# Patient Record
Sex: FEMALE | Race: WHITE | Employment: FULL TIME | URBAN - METROPOLITAN AREA
[De-identification: names, ages, dates, MRNs, and addresses within clinical notes are randomized per-mention and may not be internally consistent; named-entity substitution may affect disease eponyms.]

---

## 2019-09-27 ENCOUNTER — OFFICE VISIT (OUTPATIENT)
Dept: PRIMARY CARE CLINIC | Age: 24
End: 2019-09-27

## 2019-09-27 VITALS
SYSTOLIC BLOOD PRESSURE: 113 MMHG | WEIGHT: 124.4 LBS | DIASTOLIC BLOOD PRESSURE: 79 MMHG | HEART RATE: 81 BPM | TEMPERATURE: 98.3 F | HEIGHT: 64 IN | BODY MASS INDEX: 21.24 KG/M2 | RESPIRATION RATE: 17 BRPM | OXYGEN SATURATION: 95 %

## 2019-09-27 DIAGNOSIS — F41.0 PANIC ATTACK: ICD-10-CM

## 2019-09-27 DIAGNOSIS — F32.A DEPRESSION, UNSPECIFIED DEPRESSION TYPE: Primary | ICD-10-CM

## 2019-09-27 DIAGNOSIS — Z76.89 ENCOUNTER TO ESTABLISH CARE: ICD-10-CM

## 2019-09-27 RX ORDER — BUPROPION HYDROCHLORIDE 150 MG/1
150 TABLET ORAL
Qty: 30 TAB | Refills: 0 | Status: SHIPPED | OUTPATIENT
Start: 2019-09-27 | End: 2019-10-10 | Stop reason: SDUPTHER

## 2019-09-27 RX ORDER — HYDROXYZINE PAMOATE 25 MG/1
CAPSULE ORAL
Qty: 30 CAP | Refills: 0 | Status: SHIPPED | OUTPATIENT
Start: 2019-09-27 | End: 2022-02-22

## 2019-09-27 NOTE — PROGRESS NOTES
Diana Cross is a 21 y.o. female  Chief Complaint   Patient presents with   [facility]    Request For New Medication     Discuss anti depressants per psychologist Alejandra Burroughs     1. Have you been to the ER, urgent care clinic since your last visit? Hospitalized since your last visit? No    2. Have you seen or consulted any other health care providers outside of the [address] [place] [address] since your last visit? Include any pap smears or colon screening. Yes, Psychologist Alejandra Burroughs.           Visit Vitals  /79 (BP 1 Location: Right arm, BP Patient Position: Sitting)   Pulse 81   Temp 98.3 °F (36.8 °C) (Oral)   Resp 17   Ht 5' 3.78\" (1.62 m)   Wt 124 lb 6.4 oz (56.4 kg)   SpO2 95%   BMI 21.50 kg/m²

## 2019-09-27 NOTE — PROGRESS NOTES
This note will not be viewable in [address] Diana Cross is a 21y.o. year old female who is a new patient to me today. She was previously followed by Dr. Art Beckham in [facility]. -LOV 3 years ago. Diana Cross is a  21 y.o. female presents for visit. Chief Complaint   Patient presents with   [address]    Depression     Discuss anti depressants per psychologist Alejandra Chapa   The history is provided by the patient. This is a new problem. The current episode started more than 1 week ago (9 months ago). The problem occurs daily. Progression since onset: waxing and waning. Associated symptoms include abdominal pain. Pertinent negatives include no chest pain, no headaches and no shortness of breath. Associated symptoms comments: Panic attacks. Joined [employer]. Exacerbated by: menses. social interaction- certain personality type. Relieved by: In mental health counseling using cognitive behavioral therapy. Treatments tried: zoloft- 3 years ago-developed a rash on her hands and patient states she felt like a zombie. The treatment provided no relief. Review of Systems   Constitutional: Positive for malaise/fatigue (chronic). Negative for chills and fever. Eyes: Negative for blurred vision and double vision. Respiratory: Negative for shortness of breath. Cardiovascular: Negative for chest pain and palpitations. Gastrointestinal: Positive for abdominal pain and nausea (Dry heaves). Genitourinary: Negative for dysuria. Musculoskeletal: Negative for back pain and myalgias. Neurological: Negative for headaches. Psychiatric/Behavioral: Positive for depression. Negative for hallucinations, memory loss, substance abuse and suicidal ideas. The patient is nervous/anxious (panic attacks) and has insomnia (able to fall asleep).          Past Medical History:   Diagnosis Date    Borderline personality disorder ([facility] [address] 04/2019    Concussion 2016    Depression Past Surgical History:   Procedure Laterality Date    HX WISDOM TEETH EXTRACTION  2011        Social History     Tobacco Use    Smoking status: Never Smoker    Smokeless tobacco: Never Used   Substance Use Topics    Alcohol use: Never     Frequency: Never      Social History     Social History Narrative    Not on file     Family History   Problem Relation Age of Onset    Depression Mother     Anxiety Mother     Elevated Lipids Mother     Other Mother         Gestational Diabetes    Arthritis-osteo Father     Attention Deficit Disorder Sister     Depression Sister       Prior to Admission medications    Medication Sig Start Date End Date Taking? Authorizing Provider   buPROPion XL (WELLBUTRIN XL) 150 mg tablet Take 1 Tab by mouth every morning. 9/27/19  Yes Tomasa Chávez NP   hydrOXYzine pamoate (VISTARIL) 25 mg capsule 1-2 caps twice daily as need for panic attacks. Indications: anxious 9/27/19  Yes Tomasa Chávez NP      No Known Allergies       Visit Vitals  /79 (BP 1 Location: Right arm, BP Patient Position: Sitting)   Pulse 81   Temp 98.3 °F (36.8 °C) (Oral)   Resp 17   Ht 5' 3.78\" (1.62 m)   Wt 124 lb 6.4 oz (56.4 kg)   LMP 09/19/2019   SpO2 95%   BMI 21.50 kg/m²     Physical Exam   Constitutional: She is oriented to person, place, and time. She appears well-developed and well-nourished. No distress. HENT:   Head: Normocephalic and atraumatic. Right Ear: External ear normal.   Left Ear: External ear normal.   Nose: Nose normal.   Eyes: Conjunctivae are normal.   Cardiovascular: Normal rate, regular rhythm and normal heart sounds. Pulmonary/Chest: Effort normal and breath sounds normal. She has no wheezes. Neurological: She is alert and oriented to person, place, and time. Skin: Skin is warm and dry. Psychiatric: Her speech is normal and behavior is normal. She exhibits a depressed mood. Nursing note and vitals reviewed.         ASSESSMENT AND PLAN:  There are no active problems to display for this patient. ICD-10-CM ICD-9-CM   1. Depression, unspecified depression type F32.9 311   2. Panic attack F41.0 300.01   3. Encounter to establish care Z76.89 V65.8     Orders Placed This Encounter    buPROPion XL (WELLBUTRIN XL) 150 mg tablet     Sig: Take 1 Tab by mouth every morning. Dispense:  30 Tab     Refill:  0    hydrOXYzine pamoate (VISTARIL) 25 mg capsule     Si-2 caps twice daily as need for panic attacks. Indications: anxious     Dispense:  30 Cap     Refill:  0       Diagnoses and all orders for this visit:    1. Depression, unspecified depression type  -     buPROPion XL (WELLBUTRIN XL) 150 mg tablet; Take 1 Tab by mouth every morning. 2. Panic attack  -     hydrOXYzine pamoate (VISTARIL) 25 mg capsule; 1-2 caps twice daily as need for panic attacks. Indications: anxious    3. Encounter to establish care        the following changes in treatment are made: Start Wellbutrin  mg p.o. every morning. Hydroxyzine as needed panic attacks. reviewed diet, exercise and weight control    Agrees to call 911 or suicide hotline if suicidal thoughts present. Patient has good social support. Follow-up and Dispositions    · Return in about 2 weeks (around 10/11/2019), or if symptoms worsen or fail to improve. Disclaimer:  Advised her to call back or return to office if symptoms worsen/change/persist.  Discussed expected course/resolution/complications of diagnosis in detail with patient. Medication risks/benefits/costs/interactions/alternatives discussed with patient. She was given an after visit summary which includes diagnoses, current medications, & vitals. She expressed understanding with the diagnosis and plan.

## 2019-10-10 ENCOUNTER — OFFICE VISIT (OUTPATIENT)
Dept: PRIMARY CARE CLINIC | Age: 24
End: 2019-10-10

## 2019-10-10 VITALS
RESPIRATION RATE: 14 BRPM | OXYGEN SATURATION: 97 % | HEIGHT: 64 IN | HEART RATE: 98 BPM | WEIGHT: 125.2 LBS | BODY MASS INDEX: 21.37 KG/M2 | SYSTOLIC BLOOD PRESSURE: 124 MMHG | TEMPERATURE: 98.1 F | DIASTOLIC BLOOD PRESSURE: 74 MMHG

## 2019-10-10 DIAGNOSIS — F32.A DEPRESSION, UNSPECIFIED DEPRESSION TYPE: Primary | ICD-10-CM

## 2019-10-10 DIAGNOSIS — Z76.0 MEDICATION REFILL: ICD-10-CM

## 2019-10-10 RX ORDER — BUPROPION HYDROCHLORIDE 150 MG/1
150 TABLET ORAL
Qty: 30 TAB | Refills: 2 | Status: SHIPPED | OUTPATIENT
Start: 2019-10-10 | End: 2020-01-15 | Stop reason: SDUPTHER

## 2019-10-10 NOTE — PROGRESS NOTES
This note will not be viewable in [address] Dipika Zacarias is a  21 y.o. female presents for visit. Chief Complaint   Patient presents with    Depression     f/u    Medication Evaluation       HPI      Patient presents for follow-up depression. She started taking Wellbutrin  mg p.o. daily about 2 weeks ago and is feeling well. Denies suicidal ideation and reports she experienced today feeling happy. No reported side effects. She is interested in continuing medication. Has not yet needed to use PRN hydroxyzine for anxiety attack. Review of Systems   Constitutional: Negative for chills and fever. Cardiovascular: Negative for chest pain and palpitations. Skin: Negative for itching and rash. Psychiatric/Behavioral: Positive for depression (improving with medication). Negative for suicidal ideas. Past Medical History:   Diagnosis Date    Borderline personality disorder ([facility] [address] 04/2019    Concussion 2016    Depression       Past Surgical History:   Procedure Laterality Date    HX WISDOM TEETH EXTRACTION  2011        Social History     Tobacco Use    Smoking status: Never Smoker    Smokeless tobacco: Never Used   Substance Use Topics    Alcohol use: Never     Frequency: Never      Social History     Social History Narrative    Not on file     Family History   Problem Relation Age of Onset    Depression Mother     Anxiety Mother     Elevated Lipids Mother     Other Mother         Gestational Diabetes    Arthritis-osteo Father     Attention Deficit Disorder Sister     Depression Sister       Prior to Admission medications    Medication Sig Start Date End Date Taking? Authorizing Provider   buPROPion XL (WELLBUTRIN XL) 150 mg tablet Take 1 Tab by mouth every morning. 10/10/19  Yes Lolly Chávez NP   hydrOXYzine pamoate (VISTARIL) 25 mg capsule 1-2 caps twice daily as need for panic attacks.   Indications: anxious 9/27/19   Lolly Chávez NP   buPROPion XL (WELLBUTRIN XL) 150 mg tablet Take 1 Tab by mouth every morning. 9/27/19 10/10/19  Robert Chávez Neighbors, NP      No Known Allergies       Visit Vitals  /74   Pulse 98   Temp 98.1 °F (36.7 °C) (Oral)   Resp 14   Ht 5' 3.78\" (1.62 m)   Wt 125 lb 3.2 oz (56.8 kg)   LMP 09/19/2019   SpO2 97%   BMI 21.64 kg/m²     Physical Exam   Constitutional: She is oriented to person, place, and time. She appears well-developed and well-nourished. No distress. HENT:   Head: Normocephalic and atraumatic. Right Ear: External ear normal.   Left Ear: External ear normal.   Eyes: Conjunctivae are normal.   Cardiovascular: Normal rate, regular rhythm and normal heart sounds. Pulmonary/Chest: Effort normal and breath sounds normal. She has no wheezes. Musculoskeletal: She exhibits no edema. Neurological: She is alert and oriented to person, place, and time. Skin: Skin is warm and dry. Psychiatric: She has a normal mood and affect. Her behavior is normal.   Nursing note and vitals reviewed. ASSESSMENT AND PLAN:  There are no active problems to display for this patient. ICD-10-CM ICD-9-CM   1. Depression, unspecified depression type F32.9 311   2. Medication refill Z76.0 V68.1     Orders Placed This Encounter    buPROPion XL (WELLBUTRIN XL) 150 mg tablet     Sig: Take 1 Tab by mouth every morning. Dispense:  30 Tab     Refill:  2       Diagnoses and all orders for this visit:    1. Depression, unspecified depression type  -     buPROPion XL (WELLBUTRIN XL) 150 mg tablet; Take 1 Tab by mouth every morning. 2. Medication refill  -     buPROPion XL (WELLBUTRIN XL) 150 mg tablet; Take 1 Tab by mouth every morning. current treatment plan is effective, no change in therapy        Follow-up and Dispositions    · Return in about 4 weeks (around 11/7/2019), or if symptoms worsen or fail to improve, for Depression follow-up.                Disclaimer:  Advised her to call back or return to office if symptoms worsen/change/persist.  Discussed expected course/resolution/complications of diagnosis in detail with patient. Medication risks/benefits/costs/interactions/alternatives discussed with patient. She was given an after visit summary which includes diagnoses, current medications, & vitals. She expressed understanding with the diagnosis and plan.

## 2020-01-15 ENCOUNTER — OFFICE VISIT (OUTPATIENT)
Dept: PRIMARY CARE CLINIC | Age: 25
End: 2020-01-15

## 2020-01-15 VITALS
DIASTOLIC BLOOD PRESSURE: 85 MMHG | TEMPERATURE: 97.8 F | WEIGHT: 125 LBS | RESPIRATION RATE: 16 BRPM | HEART RATE: 82 BPM | SYSTOLIC BLOOD PRESSURE: 112 MMHG | HEIGHT: 64 IN | OXYGEN SATURATION: 99 % | BODY MASS INDEX: 21.34 KG/M2

## 2020-01-15 DIAGNOSIS — Z76.0 MEDICATION REFILL: ICD-10-CM

## 2020-01-15 DIAGNOSIS — F32.A DEPRESSION, UNSPECIFIED DEPRESSION TYPE: ICD-10-CM

## 2020-01-15 RX ORDER — BUPROPION HYDROCHLORIDE 150 MG/1
150 TABLET ORAL
Qty: 90 TAB | Refills: 0 | Status: SHIPPED | OUTPATIENT
Start: 2020-01-15 | End: 2021-03-12 | Stop reason: ALTCHOICE

## 2020-01-15 NOTE — PROGRESS NOTES
[facility]   [address], [address] [facility]  P: [phone]  F: [phone]      Chief Complaint   Patient presents with    Medication Refill     wellbutrin for refill. unsure about vaccination for HPV  states has had tetnus but not over 10 years states not due yet. and [place]. states that she will confier with mother. Aggie Luong is a 21 y.o. female who presents to clinic for Medication Refill (wellbutrin for refill. unsure about vaccination for HPV  states has had tetnus but not over 10 years states not due yet. and [place] pap [place]. states that she will confier with mother. ). HPI:  Lore is a 21 yr old female who presents for depression follow-up. She has previously been evaluated by Ashia Estes our office here and is currently on Wellbutrin  mg tablet daily. She states she was also prescribed hydroxyzine 25 mg capsules, but has only required them once for anxiety. She reports that the Wellbutrin is helping her immensely. She states she works at [employer] since November, and has found the medication helps her have less low thoughts and generally she feels better. She denies any medication side effects. She is requesting a refill today. She states she is followed by a therapist for over a year, and has previously been diagnosed with borderline personality disorder. There are no active problems to display for this patient.      Past Medical History:   Diagnosis Date    Borderline personality disorder ([facility] [address] 04/2019    Concussion 2016    Depression      Past Surgical History:   Procedure Laterality Date    HX WISDOM TEETH EXTRACTION  2011     Social History     Socioeconomic History    Marital status: SINGLE     Spouse name: Not on file    Number of children: Not on file    Years of education: Not on file    Highest education level: Not on file   Occupational History    Not on file   Social Needs    Financial resource strain: Not on file    Food insecurity:     Worry: Not on file     Inability: Not on file    Transportation needs:     Medical: Not on file     Non-medical: Not on file   Tobacco Use    Smoking status: Never Smoker    Smokeless tobacco: Never Used   Substance and Sexual Activity    Alcohol use: Never     Frequency: Never    Drug use: Never    Sexual activity: Never   Lifestyle    Physical activity:     Days per week: Not on file     Minutes per session: Not on file    Stress: Not on file   Relationships    Social connections:     Talks on phone: Not on file     Gets together: Not on file     Attends Scientologist service: Not on file     Active member of club or organization: Not on file     Attends meetings of clubs or organizations: Not on file     Relationship status: Not on file    Intimate partner violence:     Fear of current or ex partner: Not on file     Emotionally abused: Not on file     Physically abused: Not on file     Forced sexual activity: Not on file   Other Topics Concern    Not on file   Social History Narrative    Not on file     Family History   Problem Relation Age of Onset    Depression Mother    [facility] Anxiety Mother     Elevated Lipids Mother     Other Mother         Gestational Diabetes    Arthritis-osteo Father     Attention Deficit Disorder Sister     Depression Sister      No Known Allergies    Current Outpatient Medications   Medication Sig Dispense Refill    buPROPion XL (WELLBUTRIN XL) 150 mg tablet Take 1 Tab by mouth every morning. 90 Tab 0    hydrOXYzine pamoate (VISTARIL) 25 mg capsule 1-2 caps twice daily as need for panic attacks. Indications: anxious 30 Cap 0           The medications were reviewed and updated in the medical record. The past medical history, past surgical history, and family history were reviewed and updated in the medical record.     REVIEW OF SYSTEMS   Review of Systems   Constitutional: Negative for malaise/fatigue. HENT: Negative for congestion. Eyes: Negative for blurred vision and pain. Respiratory: Negative for cough and shortness of breath. Cardiovascular: Negative for chest pain and palpitations. Gastrointestinal: Negative for abdominal pain and heartburn. Genitourinary: Negative for frequency and urgency. Musculoskeletal: Negative for joint pain and myalgias. Neurological: Negative for dizziness, tingling, sensory change, weakness and headaches. Psychiatric/Behavioral: Negative for depression, memory loss and substance abuse. PHYSICAL EXAM     Visit Vitals  /85 (BP 1 Location: Left arm, BP Patient Position: Sitting)   Pulse 82   Temp 97.8 °F (36.6 °C) (Oral)   Resp 16   Ht 5' 3.78\" (1.62 m)   Wt 125 lb (56.7 kg)   LMP 01/10/2020   SpO2 99%   BMI 21.60 kg/m²       Physical Exam  Vitals signs and nursing note reviewed. HENT:      Head: Normocephalic and atraumatic. Right Ear: External ear normal.      Left Ear: External ear normal.   Cardiovascular:      Rate and Rhythm: Normal rate and regular rhythm. Heart sounds: Normal heart sounds. Pulmonary:      Effort: Pulmonary effort is normal.      Breath sounds: Normal breath sounds. Musculoskeletal: Normal range of motion. Skin:     General: Skin is warm and dry. Neurological:      Mental Status: She is alert and oriented to person, place, and time. Gait: Gait is intact. Psychiatric:         Mood and Affect: Affect normal.         Judgment: Judgment normal.       ASSESSMENT/ PLAN   Diagnoses and all orders for this visit:    1. Depression, unspecified depression type  -     buPROPion XL (WELLBUTRIN XL) 150 mg tablet; Take 1 Tab by mouth every morning.  -Stable, declines further resources for me today. Discussed will need follow-ups every 6 months for medication refill. Return to office sooner for change in mood. 2. Medication refill  -     buPROPion XL (WELLBUTRIN XL) 150 mg tablet;  Take 1 Tab by mouth every morning. Disclaimer:  Advised patient to call back or return to office if symptoms worsen/change/persist.  Discussed expected course/resolution/complications of diagnosis in detail with patient.     Medication risks/benefits/alternatives discussed with patient. Patient was given an after visit summary which includes diagnoses, current medications, & vitals.      Discussed patient instructions and advised to read to all patient instructions regarding care.      Patient expressed understanding with the diagnosis and plan. This note will not be viewable in [address]         Bryan West NP  1/15/2020        (This document has been electronically signed)

## 2020-01-15 NOTE — PROGRESS NOTES
Chief Complaint   Patient presents with    Medication Refill     wellbutrin for refill. unsure about vaccination for HPV  states has had tetnus but not over 10 years states not due yet. and had pap at [facility]. states that she will confier with mother.

## 2021-02-03 ENCOUNTER — OFFICE VISIT (OUTPATIENT)
Dept: PRIMARY CARE CLINIC | Age: 26
End: 2021-02-03
Payer: COMMERCIAL

## 2021-02-03 VITALS
BODY MASS INDEX: 23.04 KG/M2 | DIASTOLIC BLOOD PRESSURE: 85 MMHG | RESPIRATION RATE: 16 BRPM | HEART RATE: 90 BPM | OXYGEN SATURATION: 100 % | WEIGHT: 130 LBS | SYSTOLIC BLOOD PRESSURE: 120 MMHG | TEMPERATURE: 98.3 F | HEIGHT: 63 IN

## 2021-02-03 DIAGNOSIS — Z79.899 NEW MEDICATION ADDED: ICD-10-CM

## 2021-02-03 DIAGNOSIS — G43.109 MIGRAINE WITH AURA AND WITHOUT STATUS MIGRAINOSUS, NOT INTRACTABLE: Primary | ICD-10-CM

## 2021-02-03 PROCEDURE — 99213 OFFICE O/P EST LOW 20 MIN: CPT | Performed by: NURSE PRACTITIONER

## 2021-02-03 PROCEDURE — 96372 THER/PROPH/DIAG INJ SC/IM: CPT | Performed by: NURSE PRACTITIONER

## 2021-02-03 RX ORDER — LANOLIN ALCOHOL/MO/W.PET/CERES
400 CREAM (GRAM) TOPICAL DAILY
Qty: 90 TAB | Refills: 1 | Status: SHIPPED | OUTPATIENT
Start: 2021-02-03 | End: 2022-02-22

## 2021-02-03 RX ORDER — KETOROLAC TROMETHAMINE 30 MG/ML
30 INJECTION, SOLUTION INTRAMUSCULAR; INTRAVENOUS
Status: COMPLETED | OUTPATIENT
Start: 2021-02-03 | End: 2021-02-03

## 2021-02-03 RX ADMIN — KETOROLAC TROMETHAMINE 30 MG: 30 INJECTION, SOLUTION INTRAMUSCULAR; INTRAVENOUS at 14:49

## 2021-02-03 NOTE — PROGRESS NOTES
Chief Complaint   Patient presents with    Head Pain     starting to reoccur, after lunch she says they kick up     1. Have you been to the ER, urgent care clinic since your last visit? Hospitalized since your last visit? No    2. Have you seen or consulted any other health care providers outside of the [address] [place] [address] since your last visit? Include any pap smears or colon screening.  No

## 2021-02-03 NOTE — LETTER
NOTIFICATION RETURN TO WORK / SCHOOL 
 
2/3/2021 2:29 PM 
 
[facility] [id number] To Whom It May Concern: 
 
Lucía Patel is currently under the care of Roxanne Taylor. She will return to work/school on: 02/04/2021 If there are questions or concerns please have the patient contact our office.  
 
 
 
Sincerely, 
 
 
Manuela Shelton NP 
 
                                
 
 Patient: Do Oloms  : 1936    Today's Date: 2018    HISTORY OF PRESENT ILLNESS:     History of Present Illness:    Do Olmos is a 79 y.o. male with AF on coumadin, CAD s/p CABG, pacemaker, CHF, dyslipidemia, CKD, diverticulosis who was admitted on  for GI bleed. Reports hematochezia on the morning of admission associated with mild abd discomfort and had 2 additional episodes of BRBPR in the ER. Denies chest pain, shortness of breath, palpitations. On admission, Hgb 9.4, Cr 3.06, INR 2.1. EKG NSR. Patient received 2 units PRBC's during course of hospitalization with Hgb corinne 6.5. EGD, colonoscopy showed no active bleed and GI determined pan-colonic diverticulosis likely source of bleed -- \"Return of similar bleed magnitude can not be prevented short of colectomy. \" Cardiology was consulted for further review of continued anticoagulation given high risk of bleed. Followed by Chandu Rivera and Sherlyn with plan for upgrade to Medtronic BIV ICD for primary prevention of sudden cardiac death.        PAST MEDICAL HISTORY:     Past Medical History:   Diagnosis Date    CAD (coronary artery disease)     CHF (congestive heart failure) (HCC)     CKD (chronic kidney disease), stage IV (HCC)     Hyperlipidemia     Hypertension     Systolic CHF, chronic ([facility] [address]        Past Surgical History:   Procedure Laterality Date    COLONOSCOPY N/A 2017    COLONOSCOPY performed by Marcelino León MD at [address] [place] [address]  N/A 2018    COLONOSCOPY performed by Zohaib Soares MD at [facility]      HX PACEMAKER           MEDICATIONS:     Current Facility-Administered Medications   Medication Dose Route Frequency Provider Last Rate Last Dose    amLODIPine (NORVASC) tablet 5 mg  5 mg Oral DAILY Esther Corrales MD   5 mg at 18 0853    hydrALAZINE (APRESOLINE) 20 mg/mL injection 10 mg  10 mg IntraVENous Q6H PRN Esther Corrales MD       [address] [place] [address] pantoprazole (PROTONIX) 40 mg in sodium chloride 0.9 % 10 mL injection  40 mg IntraVENous DAILY DAYAMI MckeonCLAYTON   40 mg at 02/09/18 0801    sodium chloride (NS) flush 5-10 mL  5-10 mL IntraVENous Q8H Lamberto Tobias MD   10 mL at 02/08/18 2235    sodium chloride (NS) flush 5-10 mL  5-10 mL IntraVENous PRN Lamberto Tobias MD        acetaminophen (TYLENOL) tablet 650 mg  650 mg Oral Q4H PRN Lamberto Tobias MD   650 mg at 02/08/18 0050    morphine injection 2 mg  2 mg IntraVENous Q4H PRN Lamberto Tobias MD        naloxone [facility]) injection 0.4 mg  0.4 mg IntraVENous PRN Lamberto Tobias MD        prochlorperazine (COMPAZINE) injection 5 mg  5 mg IntraVENous Q8H PRN Lamberto Tobias MD        finasteride (PROSCAR) tablet 5 mg  5 mg Oral DAILY Lamberto Tobias MD   5 mg at 02/09/18 0800    senna-docusate (PERICOLACE) 8.6-50 mg per tablet 1 Tab  1 Tab Oral DAILY Lamberto Tobias MD   1 Tab at 02/09/18 0800    tamsulosin (FLOMAX) capsule 0.4 mg  0.4 mg Oral DAILY Lamberto Tobias MD   0.4 mg at 02/09/18 0800    carBAMazepine (TEGretol) tablet 200 mg  200 mg Oral TID Lamberto Tobias MD   200 mg at 02/09/18 0801    0.9% sodium chloride infusion 250 mL  250 mL IntraVENous PRN Kiki Moreno MD           No Known Allergies      SOCIAL HISTORY:     Social History   Substance Use Topics    Smoking status: Former Smoker    Smokeless tobacco: Never Used    Alcohol use No         FAMILY HISTORY:     Family History   Problem Relation Age of Onset    Heart Disease Mother     Hypertension Mother          REVIEW OF SYMPTOMS:     Review of Symptoms:  Constitutional: Negative for fever, chills  HEENT: Negative for nosebleeds, tinnitus, and vision changes. Respiratory: Negative for cough, wheezing  Cardiovascular: Negative for orthopnea, claudication, syncope, and PND. Gastrointestinal: Positive blood in stool, Negative for abdominal pain, diarrhea.   Genitourinary: Negative for dysuria  Musculoskeletal: Negative for myalgias. Skin: Negative for rash  Heme: No problems bleeding. Neurological: Negative for speech change and focal weakness. PHYSICAL EXAM:     Physical Exam:  Visit Vitals    /69 (BP 1 Location: Right arm, BP Patient Position: At rest)    Pulse 62    Temp 98.2 °F (36.8 °C)    Resp 16    Ht 5' 9\" (1.753 m)    Wt 154 lb 5.2 oz (70 kg)    SpO2 100%    BMI 22.79 kg/m2     Patient appears generally well, mood and affect are appropriate and pleasant. HEENT:  Hearing intact, non-icteric, normocephalic, atraumatic. Neck Exam: Supple, No JVD or carotid bruits. Lung Exam: Clear to auscultation, even breath sounds. Cardiac Exam: Regular rate and rhythm with no murmur  Abdomen: Soft, non-tender, normal bowel sounds. No bruits or masses. Extremities: Moves all ext well. No lower extremity edema. Vascular: 2+ dorsalis pedis pulses bilaterally.   Psych: Appropriate affect  Neuro: Grossly intact      LABS / OTHER STUDIES:     Recent Results (from the past 24 hour(s))   HGB & HCT    Collection Time: 02/08/18  2:03 PM   Result Value Ref Range    HGB 8.6 (L) 12.1 - 17.0 g/dL    HCT 26.4 (L) 36.6 - 50.3 %   HGB & HCT    Collection Time: 02/08/18  9:32 PM   Result Value Ref Range    HGB 8.4 (L) 12.1 - 17.0 g/dL    HCT 26.9 (L) 36.6 - 50.3 %   CBC W/O DIFF    Collection Time: 02/09/18  1:22 AM   Result Value Ref Range    WBC 5.4 4.1 - 11.1 K/uL    RBC 2.32 (L) 4.10 - 5.70 M/uL    HGB 7.3 (L) 12.1 - 17.0 g/dL    HCT 22.6 (L) 36.6 - 50.3 %    MCV 97.4 80.0 - 99.0 FL    MCH 31.5 26.0 - 34.0 PG    MCHC 32.3 30.0 - 36.5 g/dL    RDW 14.7 (H) 11.5 - 14.5 %    PLATELET 396 (L) 972 - 400 K/uL    MPV 10.4 8.9 - 12.9 FL    NRBC 0.0 0  WBC    ABSOLUTE NRBC 0.00 0.00 - 8.13 K/uL   METABOLIC PANEL, BASIC    Collection Time: 02/09/18  1:22 AM   Result Value Ref Range    Sodium 140 136 - 145 mmol/L    Potassium 4.4 3.5 - 5.1 mmol/L    Chloride 109 (H) 97 - 108 mmol/L    CO2 24 21 - 32 mmol/L    Anion gap 7 5 - 15 mmol/L    Glucose 72 65 - 100 mg/dL    BUN 39 (H) 6 - 20 MG/DL    Creatinine 2.70 (H) 0.70 - 1.30 MG/DL    BUN/Creatinine ratio 14 12 - 20      GFR est AA 28 (L) >60 ml/min/1.73m2    GFR est non-AA 23 (L) >60 ml/min/1.73m2    Calcium 8.0 (L) 8.5 - 10.1 MG/DL   PHOSPHORUS    Collection Time: 02/09/18  1:22 AM   Result Value Ref Range    Phosphorus 3.5 2.6 - 4.7 MG/DL   MAGNESIUM    Collection Time: 02/09/18  1:22 AM   Result Value Ref Range    Magnesium 1.9 1.6 - 2.4 mg/dL         CARDIAC DIAGNOSTICS:     Cardiac Evaluation Includes:    EKG 2/7/18 - NSR  EKG 11/8/17 - Afib    Lexiscan 11/15/17 - non-diagnostic due to ST-T wave changes, normal perfusion, LVEF 50%  Echo 10/10/17 - LVEF 30%, global HK, PA systolic 54, severe TR      ASSESSMENT AND PLAN:     Assessment and Plan:  1) Atrial fibrillation, on coumadin  - [id number] score of 5  - Given significant GI bleed, including several episodes prior to being on coumadin, and no GI options short of total colectomy to decrease future bleeding risk, patient may benefit from Watchman device  - Stop anticoagulation for now; will reach out to EP    2) Cardiomyopathy, CAD s/p CABG  - Echo 10/10/17 with LVEF 30%, lexiscan cardiolite 11/15/17 with LVEF 50%  - Given improvement in LVEF, no clinical indication to upgrade ICD to BIV    3) HTN  - Labile, SBP ranging 104-200; currently on amlodipine 5 mg  - Would consider adding back hydralazine, coreg if BP persistently elevated      Resident: Ed Johnston MD     Attending note to follow. Cardiology Attending:    Patient personally seen and examined. All the elements of history and examination were personally performed. Assessment and plan was discussed and agree as written above. - Recurrent GI bleed with high risk of repeat life threatning bleeding in future precludes us to continue using Coumadin.  His CHADS vasc score is 7 therefore alternative plan for reducing the thromboembolism risk is needed and I think he will be a good candidate for WATCHMAN device. I will discuss with Dr. James Nova. For now hold plans for BiV ICD given his recovery of LVEF on recent stress Nuc. Iam Franklin MD, Samantha Cameron  [facility], [address]  [address] [place] [address], [address] [place] [address] Drive  Emma Charles, [address] [place] [address]  Ph: [phone]   Ph [phone]

## 2021-02-03 NOTE — PROGRESS NOTES
Nellie Tyler is a  31 y.o. female presents for visit. Chief Complaint   Patient presents with    Head Pain     starting to reoccur, after lunch she says they kick up     HPI  Presents with complaint of increased frequency of migraine headaches. Initially developed migraines status post concussion in 2016. Typical pattern is 1-2 migraine headaches per month with aura of dizziness. OTC medications and sleep are generally effective for management. Over the past week headaches are almost daily. Presents with a 5-day headache today. She tried multiple OTC medications but they have not been effective. 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 Suspects cologne may be the trigger but does not want to speak up at this time. Review of Systems   Constitutional: Negative for chills, fever and malaise/fatigue. HENT: Negative for congestion and sore throat. Respiratory: Negative for cough and shortness of breath. Cardiovascular: Negative for chest pain. Gastrointestinal: Negative for diarrhea, heartburn and nausea. Musculoskeletal: Negative for myalgias. Neurological: Positive for dizziness and headaches.         Past Medical History:   Diagnosis Date    Borderline personality disorder ([facility] [address] 04/2019    Concussion 2016    Depression      Past Surgical History:   Procedure Laterality Date    HX WISDOM TEETH EXTRACTION  2011       Social History     Socioeconomic History    Marital status: SINGLE     Spouse name: Not on file    Number of children: Not on file    Years of education: Not on file    Highest education level: Not on file   Occupational History    Not on file   Social Needs    Financial resource strain: Not on file    Food insecurity     Worry: Not on file     Inability: Not on file    Transportation needs     Medical: Not on file     Non-medical: Not on file   Tobacco Use    Smoking status: Never Smoker    Smokeless tobacco: Never Used Substance and Sexual Activity    Alcohol use: Never     Frequency: Never    Drug use: Never    Sexual activity: Never   Lifestyle    Physical activity     Days per week: Not on file     Minutes per session: Not on file    Stress: Not on file   Relationships    Social connections     Talks on phone: Not on file     Gets together: Not on file     Attends Yazdanism service: Not on file     Active member of club or organization: Not on file     Attends meetings of clubs or organizations: Not on file     Relationship status: Not on file    Intimate partner violence     Fear of current or ex partner: Not on file     Emotionally abused: Not on file     Physically abused: Not on file     Forced sexual activity: Not on file   Other Topics Concern    Not on file   Social History Narrative    Not on file       Family History   Problem Relation Age of Onset    Depression Mother    [facility] Anxiety Mother     Elevated Lipids Mother     Other Mother         Gestational Diabetes    Arthritis-osteo Father     Attention Deficit Disorder Sister     Depression Sister       Prior to Admission medications    Medication Sig Start Date End Date Taking? Authorizing Provider   magnesium oxide (MAG-OX) 400 mg tablet Take 1 Tab by mouth daily. Indications: migraine HA prevention 2/3/21  Yes Douglas Chávez NP   buPROPion XL (WELLBUTRIN XL) 150 mg tablet Take 1 Tab by mouth every morning. 1/15/20  Yes Evaristo Amador NP   hydrOXYzine pamoate (VISTARIL) 25 mg capsule 1-2 caps twice daily as need for panic attacks. Indications: anxious 9/27/19   Douglas Chávez NP      No Known Allergies     Visit Vitals  /85 (BP 1 Location: Left upper arm, BP Patient Position: Sitting, BP Cuff Size: Adult)   Pulse 90   Temp 98.3 °F (36.8 °C) (Oral)   Resp 16   Ht 5' 3\" (1.6 m)   Wt 130 lb (59 kg)   LMP 01/22/2021   SpO2 100%   BMI 23.03 kg/m²     Physical Exam  Vitals signs and nursing note reviewed.    Constitutional:       General: She is not in acute distress. Appearance: She is well-developed. HENT:      Head: Normocephalic and atraumatic. Right Ear: External ear normal.      Left Ear: External ear normal.   Eyes:      Conjunctiva/sclera: Conjunctivae normal.   Cardiovascular:      Rate and Rhythm: Normal rate and regular rhythm. Heart sounds: Normal heart sounds. Pulmonary:      Effort: Pulmonary effort is normal.      Breath sounds: Normal breath sounds. No wheezing. Skin:     General: Skin is warm and dry. Neurological:      Mental Status: She is alert and oriented to person, place, and time. Psychiatric:         Behavior: Behavior normal.             ASSESSMENT AND PLAN:      ICD-10-CM ICD-9-CM   1. Migraine with aura and without status migrainosus, not intractable  G43.109 346.00   2. New medication added  Z79.899 V58.69     Orders Placed This Encounter    magnesium oxide (MAG-OX) 400 mg tablet     Sig: Take 1 Tab by mouth daily. Indications: migraine HA prevention     Dispense:  90 Tab     Refill:  1    ketorolac (TORADOL) injection 30 mg     Diagnoses and all orders for this visit:    1. Migraine with aura and without status migrainosus, not intractable  -     magnesium oxide (MAG-OX) 400 mg tablet; Take 1 Tab by mouth daily. Indications: migraine HA prevention  -     ketorolac (TORADOL) injection 30 mg    2. New medication added  -     magnesium oxide (MAG-OX) 400 mg tablet; Take 1 Tab by mouth daily. Indications: migraine HA prevention        the following changes in treatment are made: Mag Oxide 400 mg qhs      Avoid triggers. Follow-up and Dispositions    · Return in about 3 months (around 5/3/2021), or if symptoms worsen or fail to improve, for chronic care. Disclaimer:  Advised her to call back or return to office if symptoms worsen/change/persist.  Discussed expected course/resolution/complications of diagnosis in detail with patient.     Medication risks/benefits/alternatives discussed with patient. She was given an after visit summary which includes diagnoses, current medications, & vitals. Discussed patient instructions and advised to read to all patient instructions regarding care. She expressed understanding with the diagnosis and plan.

## 2021-03-12 ENCOUNTER — VIRTUAL VISIT (OUTPATIENT)
Dept: PRIMARY CARE CLINIC | Age: 26
End: 2021-03-12
Payer: COMMERCIAL

## 2021-03-12 DIAGNOSIS — F32.A DEPRESSION, UNSPECIFIED DEPRESSION TYPE: Primary | ICD-10-CM

## 2021-03-12 DIAGNOSIS — G43.109 MIGRAINE WITH AURA AND WITHOUT STATUS MIGRAINOSUS, NOT INTRACTABLE: ICD-10-CM

## 2021-03-12 PROCEDURE — 99213 OFFICE O/P EST LOW 20 MIN: CPT | Performed by: NURSE PRACTITIONER

## 2021-03-12 NOTE — LETTER
NOTIFICATION RETURN TO WORK / SCHOOL 
 
3/12/2021 4:40 PM 
 
[facility] [id number] To Whom It May Concern: 
 
Jono Saini is currently under the care of Roxanne Taylor. She will return to work/school on: Monday, March 15, 2021. Please excuse work absences on March 10, 11th, and 12th due to illness. If there are questions or concerns please have the patient contact our office.  
 
 
 
Sincerely, 
 
 
Hudson Mayo NP

## 2021-03-12 NOTE — PROGRESS NOTES
Bia Nicolas (: 1995) is a 31 y.o. female, established patient, here for evaluation of the following chief complaint(s):   Medication Evaluation and Letter for School/Work       ASSESSMENT/PLAN:  1. Depression, unspecified depression type  2. Migraine with aura and without status migrainosus, not intractable  the following changes in treatment are made: Discontinue Wellbutrin. Monitor depression symptoms and follow-up as needed. Continue magnesium oxide for migraine prevention. Return if symptoms worsen or fail to improve. SUBJECTIVE/OBJECTIVE:  HPI  Presents to follow-up on medication management for depression. She takes Wellbutrin  mg p.o. daily but ran out about a week ago. Endorses decreased dizzy spells and better migraine headache control off Wellbutrin. Initially mood dropped but now starting to feel better again. She is active in mental health counseling and interested in managing symptoms without antidepressant at this time. Requests a work note. Missed the past 3 days of work due to a stomach bug. Feeling better now. Review of Systems   Constitutional: Negative for fever. Cardiovascular: Negative for chest pain. Gastrointestinal:        Resolved   Neurological: Positive for headaches (decreased frequency). Psychiatric/Behavioral: Positive for dysphoric mood (manageable with counseling). Negative for suicidal ideas. No flowsheet data found. Physical Exam  Vitals signs reviewed. Constitutional:       General: She is not in acute distress. Appearance: She is not diaphoretic. HENT:      Head: Normocephalic and atraumatic. Eyes:      Conjunctiva/sclera: Conjunctivae normal.   Neck:      Trachea: Phonation normal.   Pulmonary:      Effort: Pulmonary effort is normal. No respiratory distress. Skin:     General: Skin is dry. Neurological:      Mental Status: She is alert.    Psychiatric:         Mood and Affect: Mood normal.         Behavior: Behavior normal.           Eliverto Lomariia, was evaluated through a synchronous (real-time) audio-video encounter. The patient (or guardian if applicable) is aware that this is a billable service. Verbal consent to proceed has been obtained within the past 12 months. The visit was conducted pursuant to the emergency declaration under the [address] [place] [address] and the [employer] and [employer] General Act. Patient identification was verified, and a caregiver was present when appropriate. The patient was located in a state where the provider was credentialed to provide care. An electronic signature was used to authenticate this note.   -- Uli Longo NP

## 2021-07-08 ENCOUNTER — OFFICE VISIT (OUTPATIENT)
Dept: PRIMARY CARE CLINIC | Age: 26
End: 2021-07-08
Payer: COMMERCIAL

## 2021-07-08 VITALS
HEIGHT: 63 IN | OXYGEN SATURATION: 96 % | DIASTOLIC BLOOD PRESSURE: 77 MMHG | WEIGHT: 140.6 LBS | SYSTOLIC BLOOD PRESSURE: 116 MMHG | RESPIRATION RATE: 15 BRPM | HEART RATE: 79 BPM | BODY MASS INDEX: 24.91 KG/M2 | TEMPERATURE: 97.8 F

## 2021-07-08 DIAGNOSIS — E16.2 HYPOGLYCEMIA: ICD-10-CM

## 2021-07-08 DIAGNOSIS — R63.1 POLYDIPSIA: Primary | ICD-10-CM

## 2021-07-08 LAB — GLUCOSE POC: 44 MG/DL

## 2021-07-08 PROCEDURE — 82962 GLUCOSE BLOOD TEST: CPT | Performed by: NURSE PRACTITIONER

## 2021-07-08 PROCEDURE — 99213 OFFICE O/P EST LOW 20 MIN: CPT | Performed by: NURSE PRACTITIONER

## 2021-07-08 NOTE — PROGRESS NOTES
Annalise Dominguez is a  31 y.o. female presents for visit. Chief Complaint   Patient presents with    Blood sugar problem     extremely thirsty, mood swings involve around food and low energy- Thinks it could be related to Blood Sugars     HPI   Presents with concern about her blood sugar/diabetes. Frequent thirst. Positive mood swings. Typical interval between meals is 5-6 hours. Last meal today was about 3 hours ago. Drinks frequent water. Mother was diagnosed with gestational diabetes. Review of Systems   Constitutional: Negative for chills, fever and malaise/fatigue. HENT: Negative for congestion and sore throat. Respiratory: Negative for cough and shortness of breath. Cardiovascular: Negative for chest pain. Gastrointestinal: Negative for diarrhea, heartburn and nausea. Genitourinary: Negative for frequency. Musculoskeletal: Negative for myalgias. Neurological: Negative for headaches. Endo/Heme/Allergies: Positive for polydipsia.         Past Medical History:   Diagnosis Date    Borderline personality disorder ([facility] [address] 04/2019    Concussion 2016    Depression      Past Surgical History:   Procedure Laterality Date    HX WISDOM TEETH EXTRACTION  2011       Social History     Socioeconomic History    Marital status: SINGLE     Spouse name: Not on file    Number of children: Not on file    Years of education: Not on file    Highest education level: Not on file   Occupational History    Not on file   Tobacco Use    Smoking status: Never Smoker    Smokeless tobacco: Never Used   Vaping Use    Vaping Use: Never used   Substance and Sexual Activity    Alcohol use: Never    Drug use: Never    Sexual activity: Never   Other Topics Concern    Not on file   Social History Narrative    Not on file     Social Determinants of Health     Financial Resource Strain:     Difficulty of Paying Living Expenses:    Food Insecurity:     Worried About Running Out of Food in the Last Year:  Ran Out of Food in the Last Year:    Transportation Needs:     Lack of Transportation (Medical):  Lack of Transportation (Non-Medical):    Physical Activity:     Days of Exercise per Week:     Minutes of Exercise per Session:    Stress:     Feeling of Stress :    Social Connections:     Frequency of Communication with Friends and Family:     Frequency of Social Gatherings with Friends and Family:     Attends Orthodoxy Services:     Active Member of Clubs or Organizations:     Attends Club or Organization Meetings:     Marital Status:    Intimate Partner Violence:     Fear of Current or Ex-Partner:     Emotionally Abused:     Physically Abused:     Sexually Abused:        Family History   Problem Relation Age of Onset    Depression Mother     Anxiety Mother     Elevated Lipids Mother     Other Mother         Gestational Diabetes    Arthritis-osteo Father     Attention Deficit Disorder Sister     Depression Sister       Prior to Admission medications    Medication Sig Start Date End Date Taking? Authorizing Provider   magnesium oxide (MAG-OX) 400 mg tablet Take 1 Tab by mouth daily. Indications: migraine HA prevention  Patient not taking: Reported on 7/8/2021 2/3/21   Brad Dhaliwal NP   hydrOXYzine pamoate (VISTARIL) 25 mg capsule 1-2 caps twice daily as need for panic attacks. Indications: anxious  Patient not taking: Reported on 7/8/2021 9/27/19   Radha Chávez NP      No Known Allergies     Visit Vitals  /77 (BP 1 Location: Right arm)   Pulse 79   Temp 97.8 °F (36.6 °C)   Resp 15   Ht 5' 3\" (1.6 m)   Wt 140 lb 9.6 oz (63.8 kg)   SpO2 96%   BMI 24.91 kg/m²     Physical Exam  Vitals and nursing note reviewed. Constitutional:       General: She is not in acute distress. Appearance: Normal appearance. She is well-developed. HENT:      Head: Normocephalic and atraumatic.       Right Ear: External ear normal.      Left Ear: External ear normal.      Nose: Nose normal.   Eyes:      Conjunctiva/sclera: Conjunctivae normal.   Cardiovascular:      Rate and Rhythm: Normal rate and regular rhythm. Heart sounds: Normal heart sounds. Pulmonary:      Effort: Pulmonary effort is normal.      Breath sounds: Normal breath sounds. No wheezing. Skin:     General: Skin is warm and dry. Neurological:      Mental Status: She is alert and oriented to person, place, and time. Psychiatric:         Speech: Speech normal.         Behavior: Behavior normal.         44 BS      No results found for this or any previous visit (from the past 24 hour(s)). There are no problems to display for this patient. ASSESSMENT AND PLAN:      ICD-10-CM ICD-9-CM   1. Polydipsia  R63.1 783.5   2. Hypoglycemia  E16.2 251.2     Orders Placed This Encounter    HEMOGLOBIN A1C WITH EAG     Standing Status:   Future     Number of Occurrences:   1     Standing Expiration Date:   7/8/2022    CBC W/O DIFF     Standing Status:   Future     Number of Occurrences:   1     Standing Expiration Date:   5/7/4556    METABOLIC PANEL, COMPREHENSIVE     Standing Status:   Future     Number of Occurrences:   1     Standing Expiration Date:   7/8/2022    AMB POC GLUCOSE BLOOD, BY GLUCOSE MONITORING DEVICE     Diagnoses and all orders for this visit:    1. Polydipsia  -     AMB POC GLUCOSE BLOOD, BY GLUCOSE MONITORING DEVICE  -     HEMOGLOBIN A1C WITH EAG; Future  -     CBC W/O DIFF; Future  -     METABOLIC PANEL, COMPREHENSIVE; Future    2. Hypoglycemia  -     METABOLIC PANEL, COMPREHENSIVE; Future        -    POC glucose reading  Is 44.       lab results and schedule of future lab studies reviewed with patient  reviewed diet, exercise and weight control  Advised patient to eat every 3 hours to minimize risk of hypoglycemia        Follow-up and Dispositions    · Return if symptoms worsen or fail to improve, for after labs back.            Disclaimer:  Advised her to call back or return to office if symptoms worsen/change/persist.  Discussed expected course/resolution/complications of diagnosis in detail with patient. Medication risks/benefits/alternatives discussed with patient. She was given an after visit summary which includes diagnoses, current medications, & vitals. Discussed patient instructions and advised to read to all patient instructions regarding care. She expressed understanding with the diagnosis and plan.

## 2021-07-08 NOTE — PROGRESS NOTES
Chief Complaint   Patient presents with    Blood sugar problem     extremely thirsty, mood swings involve around food and low energy- Thinks it could be related to Blood Sugars       Visit Vitals  /77 (BP 1 Location: Right arm)   Pulse 79   Temp 97.8 °F (36.6 °C)   Resp 15   Ht 5' 3\" (1.6 m)   Wt 140 lb 9.6 oz (63.8 kg)   SpO2 96%   BMI 24.91 kg/m²       1. Have you been to the ER, urgent care clinic since your last visit? Hospitalized since your last visit? No    2. Have you seen or consulted any other health care providers outside of the [address] [place] [address] since your last visit? Include any pap smears or colon screening.  Yes Therapist- Dr David Tavares

## 2022-02-21 ENCOUNTER — OFFICE VISIT (OUTPATIENT)
Dept: NEUROLOGY | Age: 27
End: 2022-02-21
Payer: COMMERCIAL

## 2022-02-21 VITALS
HEIGHT: 65 IN | DIASTOLIC BLOOD PRESSURE: 62 MMHG | RESPIRATION RATE: 16 BRPM | WEIGHT: 151 LBS | SYSTOLIC BLOOD PRESSURE: 118 MMHG | HEART RATE: 81 BPM | TEMPERATURE: 97.8 F | BODY MASS INDEX: 25.16 KG/M2 | OXYGEN SATURATION: 97 %

## 2022-02-21 DIAGNOSIS — F07.81 POSTCONCUSSION SYNDROME: Primary | ICD-10-CM

## 2022-02-21 DIAGNOSIS — R42 DIZZINESS: ICD-10-CM

## 2022-02-21 DIAGNOSIS — G44.329 CHRONIC POST-TRAUMATIC HEADACHE, NOT INTRACTABLE: ICD-10-CM

## 2022-02-21 DIAGNOSIS — G43.109 CHRONIC MIGRAINE WITH AURA: ICD-10-CM

## 2022-02-21 PROCEDURE — 99205 OFFICE O/P NEW HI 60 MIN: CPT | Performed by: PSYCHIATRY & NEUROLOGY

## 2022-02-21 RX ORDER — AMITRIPTYLINE HYDROCHLORIDE 10 MG/1
10 TABLET, FILM COATED ORAL
Qty: 30 TABLET | Refills: 1 | Status: SHIPPED | OUTPATIENT
Start: 2022-02-21

## 2022-02-21 NOTE — PROGRESS NOTES
Chief Complaint   Patient presents with    New Patient     since 4108 she was in [employer] and had a concussion and she has had migraine off and on . In october of 2021 she had migraines with blind spots and motor control loss. then had again and had migraine with the blnd spots. and now it feels fzzy in the vision.

## 2022-02-21 NOTE — PROGRESS NOTES
Neurology Consult Note      HISTORY PROVIDED BY: patient    Chief Complaint:   Chief Complaint   Patient presents with    New Patient     since 2509 she was in [employer] academy and had a concussion and she has had migraine off and on . In october of 2021 she had migraines with blind spots and motor control loss. then had again and had migraine with the blnd spots. and now it feels fzzy in the vision. Subjective:    Harris Paul is a 31 y.o. adult who presents in consultation for headaches. This is a 58-year-old right-handed white female with history of concussion, borderline personality disorder, depression, who was referred to the clinic to evaluate for headache. 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 Patient noted that at that time, she had multiple trauma to the head to the extent she was self blacked out because at one point, she  was not focusing, confused, was taken to the  emergency room [facility] where she was treated and released. Subsequently, patient says she started having frequent headache. She says  in September 2016 to December 2016 she was having nearly daily headache, then the headache transformed into migraine form of headache. She says it is becoming  more frequent. . Headache is throbbing in nature mostly frontal, occasional sharp pain coming from the back of the head. It is associated with dizziness, vertigo blurry vision, nausea, photophobia, phonophobia. She noted that the headache has  changed in nature. She says in October 2021, she had episode in which there is blind spots, and she lost control use of her hands as the arms are very weak, this went on for couple days. . She however had not lost consciousness. Headache has started to become more severe. She has been experiencing numbness and tingling sensation with blurry vision.  She says sometimes trigger will be strong odor  She denies dysphagia or odynophagia.   Review of Systems - General ROS: positive for  - fatigue and sleep disturbance  Psychological ROS: positive for - anxiety, depression and sleep disturbances  Ophthalmic ROS: positive for - blurry vision, decreased vision and photophobia  ENT ROS: positive for - headaches, vertigo and visual changes  Allergy and Immunology ROS: negative  Hematological and Lymphatic ROS: negative  Endocrine ROS: negative  Respiratory ROS: no cough, shortness of breath, or wheezing  Cardiovascular ROS: no chest pain or dyspnea on exertion  Gastrointestinal ROS: no abdominal pain, change in bowel habits, or black or bloody stools  Genito-Urinary ROS: no dysuria, trouble voiding, or hematuria  Musculoskeletal ROS: positive for - muscle pain and muscular weakness  Neurological ROS: positive for - dizziness, headaches, numbness/tingling, visual changes and weakness  Dermatological ROS: negative  Past Medical History:   Diagnosis Date    [facility]    Concussion 2016    Depression       Past Surgical History:   Procedure Laterality Date    HX WISDOM TEETH EXTRACTION  2011      Social History     Socioeconomic History    Marital status: SINGLE     Spouse name: Not on file    Number of children: Not on file    Years of education: Not on file    Highest education level: Not on file   Occupational History    Not on file   Tobacco Use    Smoking status: Never Smoker    Smokeless tobacco: Never Used   Vaping Use    Vaping Use: Never used   Substance and Sexual Activity    Alcohol use: Never    Drug use: Never    Sexual activity: Never   Other Topics Concern    Not on file   Social History Narrative    Not on file     Social Determinants of Health     Financial Resource Strain:     Difficulty of Paying Living Expenses: Not on file   Food Insecurity:     Worried About Running Out of Food in the Last Year: Not on file    Brianne of Food in the Last Year: Not on file Transportation Needs:     Lack of Transportation (Medical): Not on file    Lack of Transportation (Non-Medical): Not on file   Physical Activity:     Days of Exercise per Week: Not on file    Minutes of Exercise per Session: Not on file   Stress:     Feeling of Stress : Not on file   Social Connections:     Frequency of Communication with Friends and Family: Not on file    Frequency of Social Gatherings with Friends and Family: Not on file    Attends Bahai Services: Not on file    Active Member of [address] [place] [address] [employer] or Organizations: Not on file    Attends Club or Organization Meetings: Not on file    Marital Status: Not on file   Intimate Partner Violence:     Fear of Current or Ex-Partner: Not on file    Emotionally Abused: Not on file    Physically Abused: Not on file    Sexually Abused: Not on file   Housing Stability:     Unable to Pay for Housing in the Last Year: Not on file    Number of [place] in the Last Year: Not on file    Unstable Housing in the Last Year: Not on file     Family History   Problem Relation Age of Onset    Depression Mother     Anxiety Mother     Elevated Lipids Mother     Other Mother         Gestational Diabetes    OSTEOARTHRITIS Father     Attention Deficit Disorder Sister     Depression Sister          Objective:   ROS  As per HPI    No Known Allergies     Meds:  Outpatient Medications Prior to Visit   Medication Sig Dispense Refill    magnesium oxide (MAG-OX) 400 mg tablet Take 1 Tab by mouth daily. Indications: migraine HA prevention (Patient not taking: Reported on 7/8/2021) 90 Tab 1    hydrOXYzine pamoate (VISTARIL) 25 mg capsule 1-2 caps twice daily as need for panic attacks. Indications: anxious (Patient not taking: Reported on 7/8/2021) 30 Cap 0     No facility-administered medications prior to visit. Imaging:  MRI Results (most recent):  No results found for this or any previous visit.      CT Results (most recent):  No results found for this or any previous visit. Reviewed records in [facility] tab today    Lab Review   Results for orders placed or performed in visit on 11/42/14   METABOLIC PANEL, COMPREHENSIVE   Result Value Ref Range    Sodium 143 136 - 145 mmol/L    Potassium 4.0 3.5 - 5.1 mmol/L    Chloride 110 (H) 97 - 108 mmol/L    CO2 27 21 - 32 mmol/L    Anion gap 6 5 - 15 mmol/L    Glucose 81 65 - 100 mg/dL    BUN 8 6 - 20 MG/DL    Creatinine 0.78 0.55 - 1.02 MG/DL    BUN/Creatinine ratio 10 (L) 12 - 20      GFR est AA >60 >60 ml/min/1.73m2    GFR est non-AA >60 >60 ml/min/1.73m2    Calcium 9.1 8.5 - 10.1 MG/DL    Bilirubin, total 0.3 0.2 - 1.0 MG/DL    ALT (SGPT) 19 12 - 78 U/L    AST (SGOT) 16 15 - 37 U/L    Alk. phosphatase 60 45 - 117 U/L    Protein, total 7.0 6.4 - 8.2 g/dL    Albumin 3.9 3.5 - 5.0 g/dL    Globulin 3.1 2.0 - 4.0 g/dL    A-G Ratio 1.3 1.1 - 2.2     CBC W/O DIFF   Result Value Ref Range    WBC 7.1 3.6 - 11.0 K/uL    RBC 5.17 3.80 - 5.20 M/uL    HGB 13.5 11.5 - 16.0 g/dL    HCT 42.9 35.0 - 47.0 %    MCV 83.0 80.0 - 99.0 FL    MCH 26.1 26.0 - 34.0 PG    MCHC 31.5 30.0 - 36.5 g/dL    RDW 13.6 11.5 - 14.5 %    PLATELET 110 481 - 796 K/uL    MPV 11.2 8.9 - 12.9 FL    NRBC 0.0 0  WBC    ABSOLUTE NRBC 0.00 0.00 - 0.01 K/uL   HEMOGLOBIN A1C WITH EAG   Result Value Ref Range    Hemoglobin A1c 5.0 4.0 - 5.6 %    Est. average glucose 97 mg/dL        Exam:  Visit Vitals  /62 (BP 1 Location: Left upper arm, BP Patient Position: Sitting, BP Cuff Size: Adult)   Pulse 81   Temp 97.8 °F (36.6 °C) (Temporal)   Resp 16   Ht 5' 5\" (1.651 m)   Wt 151 lb (68.5 kg)   LMP 02/20/2022   SpO2 97%   BMI 25.13 kg/m²     General:  Alert, cooperative, no distress. Head:  Normocephalic, without obvious abnormality, atraumatic. Respiratory:  Heart:   Non labored breathing  Regular rate and rhythm, no murmurs   Neck:   2+ carotids, no bruits. Left occipitalis tenderness   Extremities: Warm, no cyanosis or edema.    Pulses: 2+ radial pulses. Neurologic:  MS: Alert and oriented x 4, speech intact. Language intact, able to name, repeat, and follow all commands. Attention and fund of knowledge appropriate. Recent and remote memory intact. Cranial Nerves:  II: visual fields Full to confrontation   II: pupils Equal, round, reactive to light   II: optic disc No papilledema   III,VII: ptosis none   III,IV,VI: extraocular muscles  EOMI, no nystagmus or diplopia   V: facial light touch sensation  normal   VII: facial muscle function   symmetric   VIII: hearing intact   IX: soft palate elevation  normal   XI: trapezius strength  5/5   XI: sternocleidomastoid strength 5/5   XII: tongue  Midline     Motor: normal bulk and tone, no tremor              Strength: 5/5 throughout, no PD  Sensory: intact to LT, PP, temperature and vibration  Coordination: FTN and HTS intact, VICTOR MANUEL intact,Romberg negative  Gait: normal gait, able to heel, toe, and tandem walk  Reflexes: 2+ symmetric  Plantar: Withdrawn           Assessment/Plan       ICD-10-CM ICD-9-CM    1. Postconcussion syndrome  F07.81 310.2 MRI BRAIN W WO CONT      EEG   2. Chronic post-traumatic headache, not intractable  G44.329 339.22 MRI BRAIN W WO CONT   3. Chronic migraine with aura  G43.109 346.00    4. Dizziness  R42 780.4 MRI BRAIN W WO CONT      EEG   Plan:  Nurtec ODT 75 mg p.o. as needed for severe headache  Elavil 10 mg p.o. nightly  MRI of the brain without gadolinium  EEG    Follow-up and Dispositions    · Return in about 2 months (around 4/21/2022). Thank you very much for this consultation.      Signed:  Cecil Delgado MD  2/21/2022

## 2022-03-01 ENCOUNTER — TELEPHONE (OUTPATIENT)
Dept: NEUROLOGY | Age: 27
End: 2022-03-01

## 2022-03-01 NOTE — TELEPHONE ENCOUNTER
Received automated call from [id number] [facility] with a case number to call back. [id number]    Called back to obtain pt info. Automated message stated that \"a service\" has been approved for pt. The auth was good for 180 days and exp on 8/27/22. No other info on what was approved. Stated to call [phone] if there were any questions. Automated system also stated that a letter would be sent to us and pt with details.

## 2022-03-02 ENCOUNTER — APPOINTMENT (OUTPATIENT)
Dept: MRI IMAGING | Age: 27
End: 2022-03-02
Attending: PSYCHIATRY & NEUROLOGY
Payer: COMMERCIAL

## 2022-03-02 ENCOUNTER — HOSPITAL ENCOUNTER (OUTPATIENT)
Dept: NEUROLOGY | Age: 27
Discharge: HOME | End: 2022-03-02
Attending: PSYCHIATRY & NEUROLOGY
Payer: COMMERCIAL

## 2022-03-02 DIAGNOSIS — R42 DIZZINESS: ICD-10-CM

## 2022-03-02 DIAGNOSIS — F07.81 POSTCONCUSSION SYNDROME: ICD-10-CM

## 2022-03-02 PROCEDURE — 95816 EEG AWAKE AND DROWSY: CPT

## 2022-03-10 ENCOUNTER — OFFICE VISIT (OUTPATIENT)
Dept: PRIMARY CARE CLINIC | Age: 27
End: 2022-03-10
Payer: COMMERCIAL

## 2022-03-10 VITALS
HEART RATE: 111 BPM | DIASTOLIC BLOOD PRESSURE: 81 MMHG | WEIGHT: 150 LBS | TEMPERATURE: 97.5 F | OXYGEN SATURATION: 97 % | RESPIRATION RATE: 16 BRPM | SYSTOLIC BLOOD PRESSURE: 116 MMHG | BODY MASS INDEX: 24.99 KG/M2 | HEIGHT: 65 IN

## 2022-03-10 DIAGNOSIS — G89.29 CHRONIC MIDLINE LOW BACK PAIN WITHOUT SCIATICA: Primary | ICD-10-CM

## 2022-03-10 DIAGNOSIS — R00.0 TACHYCARDIA: ICD-10-CM

## 2022-03-10 DIAGNOSIS — M54.50 CHRONIC MIDLINE LOW BACK PAIN WITHOUT SCIATICA: Primary | ICD-10-CM

## 2022-03-10 DIAGNOSIS — G43.109 MIGRAINE WITH AURA AND WITHOUT STATUS MIGRAINOSUS, NOT INTRACTABLE: ICD-10-CM

## 2022-03-10 PROCEDURE — 99213 OFFICE O/P EST LOW 20 MIN: CPT | Performed by: INTERNAL MEDICINE

## 2022-03-10 NOTE — PROGRESS NOTES
Savita Berry is a 31 y.o.  adult and presents with     Chief Complaint   Patient presents with    Establish Care     Pt is here to establish care. Pt was picking computers last year when she suddenly started with low back pain. Pain got better by October. This January pt picked up water bottles and back seized up and started with low back pain . Pt has appt with Spine surgery in May. Pt is supposed to get MR brain - had conccusion in 2016. Pt had symptoms of blind spots , dizziness, aura,headaches, numbness left side of arm and face and trouble typing in October 26, 2021. Pt saw Neurology - was felt to have post concussion syndrome and is supposed to get MRI brain. Pt is taking ELAvil and that seems to help. NO FH for thyroid. Menses are regular. Past Medical History:   Diagnosis Date    Borderline personality disorder ([facility] [address] 04/2019    Concussion 2016    Depression      Past Surgical History:   Procedure Laterality Date    HX WISDOM TEETH EXTRACTION  2011     Current Outpatient Medications   Medication Sig    amitriptyline (ELAVIL) 10 mg tablet Take 1 Tablet by mouth nightly. No current facility-administered medications for this visit. Health Maintenance   Topic Date Due    Hepatitis C Screening  Never done    DTaP/Tdap/Td series (1 - Tdap) Never done    Pap Smear  Never done    Flu Vaccine (1) 09/01/2021    COVID-19 Vaccine (3 - Booster for Pfizer series) 10/26/2021    Depression Screen  02/21/2023    Pneumococcal 0-64 years  Aged [employer] History   Administered Date(s) Administered    COVID-19, Pfizer Purple top, DILUTE for use, 12+ yrs, 30mcg/0.3mL dose 04/28/2021, 05/26/2021    Influenza Vaccine 10/01/2019     Patient's last menstrual period was 02/20/2022.         Allergies and Intolerances:   No Known Allergies    Family History:   Family History   Problem Relation Age of Onset    Depression Mother     Anxiety Mother     Elevated Lipids Mother    [place] Other Mother         Gestational Diabetes    OSTEOARTHRITIS Father     Attention Deficit Disorder Sister     Depression Sister        Social History:   Ibis Both \"Elyce\"  reports that Khadijah" has never smoked. Ibis Both \"Elyce\" has never used smokeless tobacco.  Ibis Both \"Elyce\"  reports no history of alcohol use. Review of Systems:   General: negative for - chills, fatigue, fever, weight change  Psych: negative for - anxiety, depression, irritability or mood swings  ENT: negative for - headaches, hearing change, nasal congestion, oral lesions, sneezing or sore throat  Heme/ Lymph: negative for - bleeding problems, bruising, pallor or swollen lymph nodes  Endo: negative for - hot flashes, polydipsia/polyuria or temperature intolerance  Resp: negative for - cough, shortness of breath or wheezing  CV: negative for - chest pain, edema or palpitations  GI: negative for - abdominal pain, change in bowel habits, constipation, diarrhea or nausea/vomiting  : negative for - dysuria, hematuria, incontinence, pelvic pain or vulvar/vaginal symptoms  MSK: negative for - joint pain, joint swelling or muscle pain  Neuro: negative for - confusion, headaches, seizures or weakness  Derm: negative for - dry skin, hair changes, rash or skin lesion changes          Physical:   Vitals:   Vitals:    03/10/22 0850   BP: 116/81   Pulse: (!) 111   Resp: 16   Temp: 97.5 °F (36.4 °C)   TempSrc: Temporal   SpO2: 97%   Weight: 150 lb (68 kg)   Height: 5' 5\" (1.651 m)           Exam:   HEENT- atraumatic,normocephalic, awake, oriented, well nourished  Neck - supple,no enlarged lymph nodes, no JVD, no thyromegaly  Chest- CTA, no rhonchi, no crackles  Heart- rrr, no murmurs / gallop/rub, mild tachycardia  Abdomen- soft,BS+,NT, no hepatosplenomegaly  Ext - no c/c/edema , slight pain elicited in the legs on SLR. Neuro- no focal deficits. Power 5/5 all extremities  Skin - warm,dry, no obvious nayana. Review of Data:   LABS:   Lab Results   Component Value Date/Time    WBC 7.1 07/09/2021 08:09 AM    HGB 13.5 07/09/2021 08:09 AM    HCT 42.9 07/09/2021 08:09 AM    PLATELET 085 74/31/0457 08:09 AM     Lab Results   Component Value Date/Time    Sodium 143 07/09/2021 08:09 AM    Potassium 4.0 07/09/2021 08:09 AM    Chloride 110 (H) 07/09/2021 08:09 AM    CO2 27 07/09/2021 08:09 AM    Glucose 81 07/09/2021 08:09 AM    BUN 8 07/09/2021 08:09 AM    Creatinine 0.78 07/09/2021 08:09 AM     No results found for: CHOL, CHOLX, CHLST, CHOLV, HDL, HDLP, LDL, LDLC, DLDLP, TGLX, TRIGL, TRIGP  No components found for: GPT        Impression / Plan:        ICD-10-CM ICD-9-CM    1. Chronic midline low back pain without sciatica  M54.50 724.2 XR SPINE LUMB 2 OR 3 V    G89.29 338.29    2. Tachycardia  R00.0 785.0 TSH 3RD GENERATION      T4, FREE   3. Migraine with aura and without status migrainosus, not intractable  G43.109 346.00      Follow up with Neurology and Orthopeidcs    Explained to patient risk benefits of the medications. Advised patient to stop meds if having any side effects. Pt verbalized understanding of the instructions. I have discussed the diagnosis with the patient and the intended plan as seen in the above orders. The patient has received an after-visit summary and questions were answered concerning future plans. I have discussed medication side effects and warnings with the patient as well. I have reviewed the plan of care with the patient, accepted their input and they are in agreement with the treatment goals. Reviewed plan of care. Patient has provided input and agrees with goals.         Darlene Crandall MD

## 2022-03-10 NOTE — PROGRESS NOTES
Chief Complaint   Patient presents with   [facility] Establish Care        Visit Vitals  /81 (BP 1 Location: Left upper arm, BP Patient Position: Sitting)   Pulse (!) 111   Temp 97.5 °F (36.4 °C) (Temporal)   Resp 16   Ht 5' 5\" (1.651 m)   Wt 150 lb (68 kg)   LMP 02/20/2022   SpO2 97%   BMI 24.96 kg/m²        1. Have you been to the ER, urgent care clinic since your last visit? Hospitalized since your last visit? No    2. Have you seen or consulted any other health care providers outside of the [address] [place] [address] since your last visit? Include any pap smears or colon screening.  No

## 2022-03-21 ENCOUNTER — HOSPITAL ENCOUNTER (OUTPATIENT)
Dept: MRI IMAGING | Age: 27
Discharge: HOME | End: 2022-03-21
Attending: PSYCHIATRY & NEUROLOGY
Payer: COMMERCIAL

## 2022-03-21 ENCOUNTER — HOSPITAL ENCOUNTER (OUTPATIENT)
Dept: GENERAL RADIOLOGY | Age: 27
Discharge: HOME | End: 2022-03-21
Attending: PSYCHIATRY & NEUROLOGY
Payer: COMMERCIAL

## 2022-03-21 VITALS — BODY MASS INDEX: 24.96 KG/M2 | WEIGHT: 150 LBS

## 2022-03-21 DIAGNOSIS — G44.329 CHRONIC POST-TRAUMATIC HEADACHE, NOT INTRACTABLE: ICD-10-CM

## 2022-03-21 DIAGNOSIS — G89.29 CHRONIC MIDLINE LOW BACK PAIN WITHOUT SCIATICA: ICD-10-CM

## 2022-03-21 DIAGNOSIS — F07.81 POSTCONCUSSION SYNDROME: ICD-10-CM

## 2022-03-21 DIAGNOSIS — R42 DIZZINESS: ICD-10-CM

## 2022-03-21 DIAGNOSIS — M54.50 CHRONIC MIDLINE LOW BACK PAIN WITHOUT SCIATICA: ICD-10-CM

## 2022-03-21 PROCEDURE — 70553 MRI BRAIN STEM W/O & W/DYE: CPT

## 2022-03-21 PROCEDURE — 74011250636 HC RX REV CODE- 250/636: Performed by: PSYCHIATRY & NEUROLOGY

## 2022-03-21 PROCEDURE — A9576 INJ PROHANCE MULTIPACK: HCPCS | Performed by: PSYCHIATRY & NEUROLOGY

## 2022-03-21 PROCEDURE — 72100 X-RAY EXAM L-S SPINE 2/3 VWS: CPT

## 2022-03-21 RX ADMIN — GADOTERIDOL 13.5 ML: 279.3 INJECTION, SOLUTION INTRAVENOUS at 18:04

## 2022-03-24 ENCOUNTER — TELEPHONE (OUTPATIENT)
Dept: NEUROLOGY | Age: 27
End: 2022-03-24

## 2022-03-24 NOTE — TELEPHONE ENCOUNTER
Reviewing the notes re: MRI and location, the MRI report was printed by a staff member at [address] disseminate if pt had test at [facility] or at [place] where the authorization would be approvable.

## 2022-04-22 ENCOUNTER — OFFICE VISIT (OUTPATIENT)
Dept: NEUROLOGY | Age: 27
End: 2022-04-22
Payer: COMMERCIAL

## 2022-04-22 VITALS
SYSTOLIC BLOOD PRESSURE: 128 MMHG | WEIGHT: 149 LBS | HEART RATE: 115 BPM | HEIGHT: 65 IN | OXYGEN SATURATION: 96 % | DIASTOLIC BLOOD PRESSURE: 88 MMHG | BODY MASS INDEX: 24.83 KG/M2

## 2022-04-22 DIAGNOSIS — G43.019 INTRACTABLE MIGRAINE WITHOUT AURA AND WITHOUT STATUS MIGRAINOSUS: ICD-10-CM

## 2022-04-22 DIAGNOSIS — G44.329 CHRONIC POST-TRAUMATIC HEADACHE, NOT INTRACTABLE: ICD-10-CM

## 2022-04-22 DIAGNOSIS — F07.81 POSTCONCUSSION SYNDROME: ICD-10-CM

## 2022-04-22 DIAGNOSIS — G43.109 CHRONIC MIGRAINE WITH AURA: Primary | ICD-10-CM

## 2022-04-22 PROCEDURE — 99214 OFFICE O/P EST MOD 30 MIN: CPT | Performed by: PSYCHIATRY & NEUROLOGY

## 2022-04-22 RX ORDER — GALCANEZUMAB 120 MG/ML
120 INJECTION, SOLUTION SUBCUTANEOUS
Qty: 1 EACH | Refills: 11 | Status: SHIPPED | OUTPATIENT
Start: 2022-04-22

## 2022-04-22 RX ORDER — GALCANEZUMAB 120 MG/ML
120 INJECTION, SOLUTION SUBCUTANEOUS ONCE
Qty: 1 EACH | Refills: 0 | Status: SHIPPED | COMMUNITY
Start: 2022-04-22 | End: 2022-04-22

## 2022-04-22 NOTE — PROGRESS NOTES
Neurology Progress Note    NAME:  Tabitha Buckley   :   1995   MRN:   [id number]     Date/Time:  2022  Subjective:      Tabitha Buckley is a 31 y.o. adult here today for follow-up for headache, postconcussion syndrome, test results. To recap:  Patient says headache started  in 2016 after sustaining concussion while training in [employer]. Patient noted that at that time, she had multiple trauma to the head to the extent she was self blacked out because at one point, she  was not focusing, confused, was taken to the  emergency room [facility] where she was treated and released. Subsequently, patient says she started having frequent headache. She says  in 2016 to 2016 she was having nearly daily headache, then the headache transformed into migraine form of headache. She says it is becoming  more frequent. . Headache is throbbing in nature mostly frontal, occasional sharp pain coming from the back of the head. It is associated with  blurry vision, nausea, photophobia, phonophobia. She noted that the headache has  changed in nature. She says in 2021, she had episode in which there is blind spots, and she lost control use of her hands as the arms are very weak, this went on for couple days. . She however had not lost consciousness. Headache has started to become more severe. She has been experiencing numbness and tingling sensation with blurry vision. She says sometimes trigger will be strong odor  MRI of the brain reviewed with patient was unremarkable  EEG was unremarkable  Patient says she still experiencing frequent headache. Frequency is about 3 to 4/week, headache is throbbing in nature, holocephalic, associated with nausea, dizziness, blurry vision, photophobia, phonophobia. Patient presenting with patient has been tried on the following prophylaxis  Depakote  Topamax  Verapamil  Currently on amitriptyline. Did not tolerate triptans.   I will start patient on Emgality 120 mg subcu q. monthly  Nurtec 75 mg p.o. as needed for severe headache  Review of Systems - General ROS: positive for  - fatigue and sleep disturbance  Psychological ROS: positive for - anxiety, depression and sleep disturbances  Ophthalmic ROS: positive for - blurry vision, decreased vision and photophobia  ENT ROS: positive for - headaches, vertigo and visual changes  Allergy and Immunology ROS: negative  Hematological and Lymphatic ROS: negative  Endocrine ROS: negative  Respiratory ROS: no cough, shortness of breath, or wheezing  Cardiovascular ROS: no chest pain or dyspnea on exertion  Gastrointestinal ROS: no abdominal pain, change in bowel habits, or black or bloody stools  Genito-Urinary ROS: no dysuria, trouble voiding, or hematuria  Musculoskeletal ROS: positive for - muscle pain and muscular weakness  Neurological ROS: positive for - dizziness, headaches, numbness/tingling, visual changes and weakness  Dermatological ROS: negative    Medications reviewed:  Current Outpatient Medications   Medication Sig Dispense Refill    galcanezumab-gnlm (Emgality Syringe) 120 mg/mL syrg 120 mg by SubCUTAneous route every thirty (30) days. 1 Each 11    amitriptyline (ELAVIL) 10 mg tablet Take 1 Tablet by mouth nightly.  30 Tablet 1        Objective:   Vitals:  Vitals:    04/22/22 1254   BP: 128/88   Pulse: (!) 115   SpO2: 96%   Weight: 149 lb (67.6 kg)   Height: 5' 5\" (1.651 m)   PainSc:   0 - No pain     Lab Data Reviewed:  Lab Results   Component Value Date/Time    WBC 7.1 07/09/2021 08:09 AM    HCT 42.9 07/09/2021 08:09 AM    HGB 13.5 07/09/2021 08:09 AM    PLATELET 982 08/04/7423 08:09 AM       Lab Results   Component Value Date/Time    Sodium 143 07/09/2021 08:09 AM    Potassium 4.0 07/09/2021 08:09 AM    Chloride 110 (H) 07/09/2021 08:09 AM    CO2 27 07/09/2021 08:09 AM    Glucose 81 07/09/2021 08:09 AM    BUN 8 07/09/2021 08:09 AM    Creatinine 0.78 07/09/2021 08:09 AM    Calcium 9.1 07/09/2021 08:09 AM       No components found for: TROPQUANT    No results found for: MONA      Lab Results   Component Value Date/Time    Hemoglobin A1c 5.0 07/09/2021 08:09 AM        No results found for: B12LT, FOL, RBCF    No results found for: MONA, ANARX, ANAIGG, XBANA    No results found for: CHOL, CHOLPOCT, CHOLX, CHLST, CHOLV, HDL, HDLPOC, HDLP, LDL, LDLCPOC, LDLC, DLDLP, VLDLC, VLDL, TGLX, TRIGL, TRIGP, TGLPOCT, CHHD, CHHDX      CT Results (recent):  No results found for this or any previous visit. MRI Results (recent):  Results from [facility] encounter on 03/21/22    MRI BRAIN W WO CONT    Narrative  Procedure: MRI of the brain with and without contrast    INDICATION: Postconcussion syndrome. Chronic posttraumatic headache. TECHNIQUE: Pre and postcontrast MRI of the brain was performed after  administration of 13.5 mL of ProHance. No comparisons:    FINDINGS: The ventricles are normal in size. No midline shift, mass effect,  edema, hemorrhage, or diffusion restriction is identified. No white matter  disease is noted. No susceptibility artifact is present on gradient echo images. Paranasal sinuses and orbits are unremarkable. Possible small developmental  venous anomaly in the left frontal lobe. Otherwise no abnormal enhancement is  identified. Impression  Unremarkable MRI of the brain with contrast. No acute intracranial  abnormality. IR Results (recent):  No results found for this or any previous visit. VAS/US Results (recent):  No results found for this or any previous visit. PHYSICAL EXAM:  General:    Alert, cooperative, no distress, appears stated age. Head:   Normocephalic, without obvious abnormality, atraumatic. Eyes:   Conjunctivae/corneas clear. PERRLA  Nose:  Nares normal. No drainage or sinus tenderness. Throat:    Lips, mucosa, and tongue normal.  No Thrush  Neck:  Supple, symmetrical,  no adenopathy, thyroid: non tender    no carotid bruit and no JVD.   Paraspinal tenderness  Back:    Symmetric,  No CVA tenderness. Lungs:   Clear to auscultation bilaterally. No Wheezing or Rhonchi. No rales. Chest wall:  No tenderness or deformity. No Accessory muscle use. Heart:   Regular rate and rhythm,  no murmur, rub or gallop. Abdomen:   Soft, non-tender. Not distended. Bowel sounds normal. No masses  Extremities: Extremities normal, atraumatic, No cyanosis. No edema. No clubbing  Skin:     Texture, turgor normal. No rashes or lesions. Not Jaundiced  Lymph nodes: Cervical, supraclavicular normal.  Psych:  Good insight. Not depressed. Anxious. NEUROLOGICAL EXAM:  Appearance: The patient is well developed, well nourished, provides a coherent history and is in no acute distress. Mental Status: Oriented to time, place and person. Mood and affect appropriate. Cranial Nerves:   Intact visual fields. Fundi are benign. SAIGE, EOM's full, no nystagmus, no ptosis. Facial sensation is normal. Corneal reflexes are intact. Facial movement is symmetric. Hearing is normal bilaterally. Palate is midline with normal sternocleidomastoid and trapezius muscles are normal. Tongue is midline. Motor:  5/5 strength in upper and lower proximal and distal muscles. Normal bulk and tone. No fasciculations. Reflexes:   Deep tendon reflexes 2+/4 and symmetrical.   Sensory:   Normal to touch, pinprick and vibration. Gait:  Normal gait. Tremor:   No tremor noted. Cerebellar:  No cerebellar signs present. Neurovascular:  Normal heart sounds and regular rhythm, peripheral pulses intact, and no carotid bruits. Assesment  1. Chronic migraine with aura  Emgality    2. Intractable migraine without aura and without status migrainosus  Emgality  [facility]  3. Chronic post-traumatic headache, not intractable  Stable    4.  Postconcussion syndrome  Stable  Amitriptyline  ___________________________________________________  PLAN: Medication and plan discussed with patient      ICD-10-CM ICD-9-CM 1. Chronic migraine with aura  G43.109 346.00    2. Intractable migraine without aura and without status migrainosus  G43.019 346.11    3. Chronic post-traumatic headache, not intractable  G44.329 339.22    4. Postconcussion syndrome  F07.81 310.2      Follow-up and Dispositions    · Return in about 4 months (around 8/22/2022).            :    ___________________________________________________    Attending Physician: Nico Matthews MD

## 2022-05-11 ENCOUNTER — OFFICE VISIT (OUTPATIENT)
Dept: ORTHOPEDIC SURGERY | Age: 27
End: 2022-05-11
Payer: COMMERCIAL

## 2022-05-11 VITALS — HEIGHT: 65 IN | WEIGHT: 149 LBS | BODY MASS INDEX: 24.83 KG/M2

## 2022-05-11 DIAGNOSIS — M54.42 CHRONIC BILATERAL LOW BACK PAIN WITH LEFT-SIDED SCIATICA: Primary | ICD-10-CM

## 2022-05-11 DIAGNOSIS — G89.29 CHRONIC BILATERAL LOW BACK PAIN WITH LEFT-SIDED SCIATICA: Primary | ICD-10-CM

## 2022-05-11 DIAGNOSIS — M51.36 DDD (DEGENERATIVE DISC DISEASE), LUMBAR: ICD-10-CM

## 2022-05-11 PROCEDURE — 99203 OFFICE O/P NEW LOW 30 MIN: CPT | Performed by: PHYSICIAN ASSISTANT

## 2022-05-11 RX ORDER — DICLOFENAC SODIUM 75 MG/1
75 TABLET, DELAYED RELEASE ORAL 2 TIMES DAILY WITH MEALS
Qty: 60 TABLET | Refills: 1 | Status: SHIPPED | OUTPATIENT
Start: 2022-05-11

## 2022-05-11 RX ORDER — ACETAMINOPHEN AND CODEINE PHOSPHATE 120; 12 MG/5ML; MG/5ML
SOLUTION ORAL
COMMUNITY
Start: 2022-03-28

## 2022-05-11 NOTE — PROGRESS NOTES
1. Have you been to the ER, urgent care clinic since your last visit? Hospitalized since your last visit? No    2. Have you seen or consulted any other health care providers outside of the [address] [place] [address] since your last visit? Include any pap smears or colon screening.  No    Chief Complaint   Patient presents with    Back Pain     Low Back, Left Leg

## 2022-05-11 NOTE — PROGRESS NOTES
Angelika Pierre (: 1995) is a 31 y.o. other patient here for evaluation of the following chief complaint(s):  Back Pain (Low Back, Left Leg)         ASSESSMENT/PLAN:  Below is the assessment and plan developed based on review of pertinent history, physical exam, labs, studies, and medications. 1. Chronic bilateral low back pain with left-sided sciatica  -     XR SPINE LUMBAR BEND/FLEXION EXTENSION; Future  -     REFERRAL TO PHYSICAL THERAPY; Future  2. DDD (degenerative disc disease), lumbar  -     REFERRAL TO PHYSICAL THERAPY; Future      The patient's radiologic findings have been reviewed with her in detail today. She has had intermittent flares of axial low back pain for approximately 1 year which began after carrying a box. Her symptoms have been flared particularly with bending and lifting. She has intermittent left lateral leg pain. We have discussed various treatment options, and I would like for her to treat her symptoms conservatively. She will start with a formal course of outpatient physical therapy as well as medications. We will see her back for a follow-up visit in 6 to 8 weeks, whereupon she will be a candidate for a MRI with persistent or worsening symptoms. Return in about 6 weeks (around 2022) for PT follow up. SUBJECTIVE/OBJECTIVE:  Angelika Pierre (: 1995) is a 31 y.o. other who presents today for the following:  Chief Complaint   Patient presents with    Back Pain     Low Back, Left Leg        HPI   Ms. Danielle Jeffery presents today as a new patient to the practice. She began with an acute onset of axial low back pain while carrying a box in . She states that she had axial low back pain that flared for several weeks and then improved. She had another flare of symptoms again in January of this year after carrying a case of water.   She states that her symptoms improved after a couple of weeks and she has had continued intermittent flares of low back pain particularly with lifting and bending. She has intermittent left lateral thigh pain to the knee. No numbness, tingling, or weakness in her legs. No bowel or bladder changes. She has been to see her PCP who performed x-rays. She was not prescribed any medications and has not had any physical therapy. She has tried over-the-counter anti-inflammatories. She is here today for further evaluation and treatment recommendations. IMAGING:  XR Results (most recent):  Results from Appointment encounter on 05/11/22    XR SPINE LUMBAR BEND/FLEXION EXTENSION    Narrative  Lateral flexion and extension films performed in the office today reveal no evidence of acute fracture, lytic lesion, or instability. There is mild disc degeneration noted at L5-S1. MRI Results (most recent):       No Known Allergies    Current Outpatient Medications   Medication Sig    norethindrone (MICRONOR) 0.35 mg tab     diclofenac EC (VOLTAREN) 75 mg EC tablet Take 1 Tablet by mouth two (2) times daily (with meals).  galcanezumab-gnlm (Emgality Syringe) 120 mg/mL syrg 120 mg by SubCUTAneous route every thirty (30) days.  amitriptyline (ELAVIL) 10 mg tablet Take 1 Tablet by mouth nightly. (Patient not taking: Reported on 5/11/2022)     No current facility-administered medications for this visit.        Past Medical History:   Diagnosis Date    Borderline personality disorder ([facility] [address] 04/2019    Concussion 2016    Depression         Past Surgical History:   Procedure Laterality Date    HX WISDOM TEETH EXTRACTION  2011       Family History   Problem Relation Age of Onset    Depression Mother     Anxiety Mother     Elevated Lipids Mother     Other Mother         Gestational Diabetes    OSTEOARTHRITIS Father     Attention Deficit Disorder Sister     Depression Sister         Social History     Tobacco Use    Smoking status: Never Smoker    Smokeless tobacco: Never Used   Substance Use Topics    Alcohol use: Never Review of Systems   Constitutional: Negative. Respiratory: Negative. Cardiovascular: Negative. Gastrointestinal: Negative. Endocrine: Negative. Genitourinary: Negative. Musculoskeletal: Positive for back pain. Skin: Negative. Allergic/Immunologic: Negative. Neurological: Negative for weakness and numbness. Hematological: Negative. Psychiatric/Behavioral: Negative. All other systems reviewed and are negative. No flowsheet data found. Vitals:  Ht 5' 5\" (1.651 m)   Wt 149 lb (67.6 kg)   BMI 24.79 kg/m²    Body mass index is 24.79 kg/m². Physical Exam    Neurologic  Sensory  Light Touch - Intact - Globally. Overall Assessment of Muscle Strength and Tone reveals  Lower Extremities - Right Iliopsoas - 5/5. Left Iliopsoas - 5/5. Right Tibialis Anterior - 5/5. Left Tibialis Anterior - 5/5. Right Gastroc-Soleus - 5/5. Left Gastroc-Soleus - 5/5. Right EHL - 5/5. Left EHL - 5/5. General Assessment of Reflexes  Right Ankle - Clonus is not present. Left Ankle - Clonus is not present. Reflexes (Dermatomes)  2/2 Normal - Left Achilles (L5-S2), Left Knee (L2-4), Right Achilles (L5-S2) and Right Knee (L2-4). Musculoskeletal  Global Assessment  Examination of related systems reveals - well-developed, well-nourished, in no acute distress, alert and oriented x 3. Gait and Station - normal gait and station and normal posture. Right Lower Extremity - normal strength and tone, normal range of motion without pain and no instability, subluxation or laxity. Left Lower Extremity - normal strength and tone, normal range of motion without pain and no instability, subluxation or laxity. Spine/Ribs/Pelvis  Cervical Spine - Examination of the cervical spine reveals - no tenderness to palpation, no pain, no swelling, edema or erythema, normal cervical spine movements and normal sensation.  Thoracic (Dorsal) Spine - Examination of the thoracic spine reveals - no tenderness over thoracic vertebrae, no pain, normal sensation and normal thoracic spine movements. Lumbosacral Spine - Examination of the lumbosacral spine reveals - no known fractures or deformities. Inspection and Palpation - Tenderness - moderate. Assessment of pain reveals the following findings - The pain is characterized as - moderate. Location - pain refers to lower back bilaterally. ROJM - Trunk Extension - 15 degrees. Lumbar Spine Flexion - 35 °. Lumbosacral Spine - Functional Testing - Babinski Test negative, Prone Knee Bending Test negative, Slump Test negative, Straight Leg Raising Test negative. Dr. Mercedes Carlos was available for immediate consult during this encounter. An electronic signature was used to authenticate this note.   -- DELVIN Yu

## 2022-10-07 ENCOUNTER — TELEPHONE (OUTPATIENT)
Dept: NEUROLOGY | Age: 27
End: 2022-10-07

## 2023-01-04 ENCOUNTER — PATIENT MESSAGE (OUTPATIENT)
Dept: OBGYN CLINIC | Age: 28
End: 2023-01-04

## 2023-01-05 ENCOUNTER — OFFICE VISIT (OUTPATIENT)
Dept: OBGYN CLINIC | Age: 28
End: 2023-01-05
Payer: COMMERCIAL

## 2023-01-05 VITALS — DIASTOLIC BLOOD PRESSURE: 85 MMHG | WEIGHT: 156 LBS | SYSTOLIC BLOOD PRESSURE: 128 MMHG | BODY MASS INDEX: 25.96 KG/M2

## 2023-01-05 DIAGNOSIS — Z01.419 ENCOUNTER FOR GYNECOLOGICAL EXAMINATION (GENERAL) (ROUTINE) WITHOUT ABNORMAL FINDINGS: Primary | ICD-10-CM

## 2023-01-05 DIAGNOSIS — Z11.51 ENCOUNTER FOR SCREENING FOR HUMAN PAPILLOMAVIRUS (HPV): ICD-10-CM

## 2023-01-05 PROCEDURE — 99385 PREV VISIT NEW AGE 18-39: CPT | Performed by: OBSTETRICS & GYNECOLOGY

## 2023-01-05 RX ORDER — ACETAMINOPHEN AND CODEINE PHOSPHATE 120; 12 MG/5ML; MG/5ML
1 SOLUTION ORAL DAILY
Qty: 90 TABLET | Refills: 3 | Status: SHIPPED | OUTPATIENT
Start: 2023-01-05 | End: 2023-04-05

## 2023-01-05 NOTE — PROGRESS NOTES
Darnell Lanes is a 28 y.o. adult returns for an annual exam     Chief Complaint   Patient presents with    Well Woman       No LMP recorded. Her periods are normal in flow and usually regular with a 26-32 day interval with 3-7 day duration. She does not have dysmenorrhea. Problems: no significant problems  Birth Control: OCP (estrogen/progesterone). Last Pap: No pap hx  She does not have a history of SATYA 2, 3 or cervical cancer. With regard to the Gardisil vaccine, she has received all 3 injections. 1. Have you been to the ER, urgent care clinic, or hospitalized since your last visit? No    2. Have you seen or consulted any other health care providers outside of the [facility] since your last visit? No    Examination chaperoned by Lyndsey Pulido LPN.

## 2023-01-06 NOTE — PROGRESS NOTES
[facility]  [email or web address]  [phone]    Alysia Pinto MD, [facility]     Annual Gynecologic Exam:  Chief Complaint   Patient presents with    Well Woman       Edgar Rivera is a No obstetric history on file. ,  28 y.o. adult   Patient's last menstrual period was 12/26/2022. She presents for her annual checkup. She is having no significant problems. Did not tolerate last gyn visit. Interested in hysterectomy. Rarely uses tampons. Per Rooming Note:  No LMP recorded. Her periods are normal in flow and usually regular with a 26-32 day interval with 3-7 day duration. She does not have dysmenorrhea. Problems: no significant problems  Birth Control: OCP (estrogen/progesterone). Last Pap: No pap hx  She does not have a history of SATYA 2, 3 or cervical cancer. With regard to the Gardisil vaccine, she has received all 3 injections. Sexual history and Contraception:  Social History     Substance and Sexual Activity   Sexual Activity Never    Birth control/protection: Pill       Past Medical History:   Diagnosis Date    Borderline personality disorder ([facility] [address] 04/2019    Concussion 2016    Depression      Current Outpatient Medications   Medication Sig    norethindrone (MICRONOR) 0.35 mg tab Take 1 Tablet by mouth daily for 90 days. diclofenac EC (VOLTAREN) 75 mg EC tablet Take 1 Tablet by mouth two (2) times daily (with meals). galcanezumab-gnlm (Emgality Syringe) 120 mg/mL syrg 120 mg by SubCUTAneous route every thirty (30) days. amitriptyline (ELAVIL) 10 mg tablet Take 1 Tablet by mouth nightly. (Patient not taking: Reported on 5/11/2022)     No current facility-administered medications for this visit. OB History   No obstetric history on file.      Past Surgical History:   Procedure Laterality Date    HX WISDOM TEETH EXTRACTION  2011     Family History   Problem Relation Age of Onset    Other cancer Mother         precancerous cells found/ hysterectomy Depression Mother     Anxiety Mother     Elevated Lipids Mother     Other Mother         Gestational Diabetes    Herniated Disc Father     OSTEOARTHRITIS Father     Attention Deficit Disorder Sister     Depression Sister      Social History     Socioeconomic History    Marital status: SINGLE     Spouse name: Not on file    Number of children: Not on file    Years of education: Not on file    Highest education level: Not on file   Occupational History    Not on file   Tobacco Use    Smoking status: Never    Smokeless tobacco: Never   Vaping Use    Vaping Use: Never used   Substance and Sexual Activity    Alcohol use: Never    Drug use: Never    Sexual activity: Never     Birth control/protection: Pill   Other Topics Concern    Not on file   Social History Narrative    Not on file     Social Determinants of Health     Financial Resource Strain: Not on file   Food Insecurity: Not on file   Transportation Needs: Not on file   Physical Activity: Not on file   Stress: Not on file   Social Connections: Not on file   Intimate Partner Violence: Not on file   Housing Stability: Not on file     Tobacco History:  reports that Khadijah" has never smoked. Elayne Faye \"Elyce\" has never used smokeless tobacco.  Alcohol Abuse:  reports no history of alcohol use. Drug Abuse:  reports no history of drug use. No Known Allergies    There is no problem list on file for this patient.       Review of Systems - History obtained from the patient and patient filled out questionnaire   Constitutional/general, HEENT, CV, Resp, GI, MSK, Neuro, Psych, Heme/lymph, Skin, Breast ROS: no significant complaints except as noted on HPI    Physical Exam  Visit Vitals  /85   Wt 156 lb (70.8 kg)   LMP 12/26/2022   BMI 25.96 kg/m²       Constitutional  Appearance: well-nourished, well developed, alert, in no acute distress    HENT  Head and Face: appears normal    Neck  Inspection/Palpation: normal appearance, no masses or tenderness  Lymph Nodes: no lymphadenopathy present  Thyroid: gland size normal, nontender, no nodules or masses present on palpation    Chest  Respiratory Effort: breathing unlabored  Auscultation: normal breath sounds    Cardiovascular  Heart: Auscultation: regular rate and rhythm without murmur    Breasts  Inspection of Breasts: breasts symmetrical, no skin changes, no discharge present, nipple appearance normal, no skin retraction present  Palpation of Breasts and Axillae: no masses present on palpation, no breast tenderness  Axillary Lymph Nodes: no lymphadenopathy present    Gastrointestinal  Abdominal Examination: abdomen non-tender to palpation, normal bowel sounds, no masses present  Liver and spleen: no hepatomegaly present, spleen not palpable  Hernias: no hernias identified    Genitourinary  External Genitalia: normal appearance for age, no discharge present, no tenderness present, no inflammatory lesions present, no masses present  Vagina: single digit exam tolerated, speculum exam not tolerated  Bladder: non-tender to palpation  Urethra: appears normal  Perineum: perineum within normal limits, no evidence of trauma, no rashes or skin lesions present  Anus: anus within normal limits, no hemorrhoids present  Inguinal Lymph Nodes: no lymphadenopathy present  After discussion with pt opted for \"blind pap\"  Brush and spatula inserted and tissue sample obtained. Skin  General Inspection: no rash, no lesions identified    Neurologic/Psychiatric  Mental Status:  Orientation: grossly oriented to person, place and time  Mood and Affect: mood normal, affect appropriate    Assessment:  28 y.o. No obstetric history on file. for well woman exam  Her current medical status is satisfactory with no evidence of significant gynecologic issues. Encounter Diagnoses   Name Primary?     Encounter for gynecological examination (general) (routine) without abnormal findings Yes    Encounter for screening for human papillomavirus (HPV)        Plan:  The patient was counseled about diet, exercise, healthy lifestyle  I recommend annual well woman exams  We discussed current pap smear and HR HPV testing guidelines. I recommended follow up one year for routine annual gynecologic exam or sooner prn  Handouts were given to the patient  I recommended follow up with a primary care physician for chronic medical problems and evaluation of non-gynecologic concerns and to please contact our office with any GYN questions or concerns. I recommended testing per CDC guidelines and at patient request.   We discussed progesterone only and non hormonal options for contraception including but not limited to condoms, IUDs, Nexplanon, and depo provera. Jose Elias Dutton up:  [x] return for annual well woman exam in one year or sooner if she is having problems  [] follow up and ultrasound  [] 6 months  [] 3 months  [] 6 weeks   [] 1 month    Orders Placed This Encounter    norethindrone (MICRONOR) 0.35 mg tab    PAP IG, CT-NG-TV, RFX APTIMA HPV ASCUS ([id number])       No results found for any visits on 01/05/23.

## 2023-01-11 NOTE — PROGRESS NOTES
Normal pap smear, message sent if [address] active. Update PMH/HM: include: Date of pap, Cytology: wnl. For HR HPV results: list NEG or POS, when done.

## 2023-06-06 SDOH — HEALTH STABILITY: PHYSICAL HEALTH: ON AVERAGE, HOW MANY MINUTES DO YOU ENGAGE IN EXERCISE AT THIS LEVEL?: 0 MIN

## 2023-06-06 SDOH — HEALTH STABILITY: PHYSICAL HEALTH: ON AVERAGE, HOW MANY DAYS PER WEEK DO YOU ENGAGE IN MODERATE TO STRENUOUS EXERCISE (LIKE A BRISK WALK)?: 0 DAYS

## 2023-06-07 ENCOUNTER — OFFICE VISIT (OUTPATIENT)
Dept: PRIMARY CARE CLINIC | Facility: CLINIC | Age: 28
End: 2023-06-07
Payer: COMMERCIAL

## 2023-06-07 VITALS
RESPIRATION RATE: 18 BRPM | WEIGHT: 161.4 LBS | DIASTOLIC BLOOD PRESSURE: 85 MMHG | TEMPERATURE: 97.1 F | BODY MASS INDEX: 26.89 KG/M2 | HEART RATE: 79 BPM | SYSTOLIC BLOOD PRESSURE: 139 MMHG | HEIGHT: 65 IN | OXYGEN SATURATION: 96 %

## 2023-06-07 DIAGNOSIS — R53.82 CHRONIC FATIGUE: ICD-10-CM

## 2023-06-07 DIAGNOSIS — M54.42 CHRONIC BILATERAL LOW BACK PAIN WITH LEFT-SIDED SCIATICA: ICD-10-CM

## 2023-06-07 DIAGNOSIS — F60.3 BORDERLINE PERSONALITY DISORDER (HCC): Primary | ICD-10-CM

## 2023-06-07 DIAGNOSIS — Z11.59 NEED FOR HEPATITIS C SCREENING TEST: ICD-10-CM

## 2023-06-07 DIAGNOSIS — Z76.89 ENCOUNTER TO ESTABLISH CARE: ICD-10-CM

## 2023-06-07 DIAGNOSIS — Z83.3 FAMILY HISTORY OF DIABETES MELLITUS: ICD-10-CM

## 2023-06-07 DIAGNOSIS — G89.29 CHRONIC BILATERAL LOW BACK PAIN WITH LEFT-SIDED SCIATICA: ICD-10-CM

## 2023-06-07 DIAGNOSIS — M25.531 RIGHT WRIST PAIN: ICD-10-CM

## 2023-06-07 DIAGNOSIS — Z13.220 SCREENING FOR CHOLESTEROL LEVEL: ICD-10-CM

## 2023-06-07 DIAGNOSIS — E55.9 VITAMIN D DEFICIENCY: ICD-10-CM

## 2023-06-07 DIAGNOSIS — G47.9 SLEEPING DIFFICULTY: ICD-10-CM

## 2023-06-07 PROCEDURE — 99214 OFFICE O/P EST MOD 30 MIN: CPT

## 2023-06-07 RX ORDER — ACETAMINOPHEN AND CODEINE PHOSPHATE 120; 12 MG/5ML; MG/5ML
SOLUTION ORAL
COMMUNITY
Start: 2023-05-17

## 2023-06-07 SDOH — ECONOMIC STABILITY: FOOD INSECURITY: WITHIN THE PAST 12 MONTHS, THE FOOD YOU BOUGHT JUST DIDN'T LAST AND YOU DIDN'T HAVE MONEY TO GET MORE.: NEVER TRUE

## 2023-06-07 SDOH — ECONOMIC STABILITY: HOUSING INSECURITY
IN THE LAST 12 MONTHS, WAS THERE A TIME WHEN YOU DID NOT HAVE A STEADY PLACE TO SLEEP OR SLEPT IN A SHELTER (INCLUDING NOW)?: NO

## 2023-06-07 SDOH — ECONOMIC STABILITY: INCOME INSECURITY: HOW HARD IS IT FOR YOU TO PAY FOR THE VERY BASICS LIKE FOOD, HOUSING, MEDICAL CARE, AND HEATING?: NOT HARD AT ALL

## 2023-06-07 SDOH — ECONOMIC STABILITY: FOOD INSECURITY: WITHIN THE PAST 12 MONTHS, YOU WORRIED THAT YOUR FOOD WOULD RUN OUT BEFORE YOU GOT MONEY TO BUY MORE.: NEVER TRUE

## 2023-06-07 ASSESSMENT — ENCOUNTER SYMPTOMS
COUGH: 0
WHEEZING: 0
CHEST TIGHTNESS: 0
SHORTNESS OF BREATH: 0
BACK PAIN: 1

## 2023-06-07 ASSESSMENT — PATIENT HEALTH QUESTIONNAIRE - PHQ9
SUM OF ALL RESPONSES TO PHQ QUESTIONS 1-9: 1
SUM OF ALL RESPONSES TO PHQ9 QUESTIONS 1 & 2: 1
1. LITTLE INTEREST OR PLEASURE IN DOING THINGS: 0
2. FEELING DOWN, DEPRESSED OR HOPELESS: 1
SUM OF ALL RESPONSES TO PHQ QUESTIONS 1-9: 1

## 2023-06-07 NOTE — PROGRESS NOTES
Identified pt with two pt identifiers(name and ). Chief Complaint   Patient presents with    New Patient    Insomnia     Patient states she can not stay asleep and doesn't feel rested     Tinnitus          Vitals:    23 1311   BP: 139/85   Site: Left Upper Arm   Position: Sitting   Cuff Size: Medium Adult   Pulse: 79   Resp: 18   Temp: 97.1 °F (36.2 °C)   TempSrc: Temporal   SpO2: 96%   Weight: 161 lb 6.4 oz (73.2 kg)   Height: 5' 5\" (1.651 m)        Health Maintenance Due   Topic Date Due    Varicella vaccine (1 of 2 - 2-dose childhood series) Never done    HIV screen  Never done    Hepatitis C screen  Never done    DTaP/Tdap/Td vaccine (1 - Tdap) Never done    COVID-19 Vaccine (3 - Booster for Pfizer series) 2021    Depression Screen  2023        1. Have you been to the ER, urgent care clinic since your last visit? Hospitalized since your last visit? No    2. Have you seen or consulted any other health care providers outside of the [address] [place] [address] since your last visit? Include any pap smears or colon screening. No    3. For patients aged 39-70: Has the patient had a colonoscopy / FIT/ Cologuard? N/A    If the patient is female:    4. For patients aged 41-77: Has the patient had a mammogram within the past 2 years? N/A      5. For patients aged 21-65: Has the patient had a pap smear?  Yes 2023
vitals. Discussed patient instructions and advised to read to all patient instructions regarding care. Patient expressed understanding with the diagnosis and plan.        NISREEN Mc NP  6/7/2023

## 2023-06-16 LAB
25(OH)D3+25(OH)D2 SERPL-MCNC: 35.5 NG/ML (ref 30–100)
ALBUMIN SERPL-MCNC: 4.4 G/DL (ref 3.9–5)
ALBUMIN/GLOB SERPL: 1.9 {RATIO} (ref 1.2–2.2)
ALP SERPL-CCNC: 56 IU/L (ref 44–121)
ALT SERPL-CCNC: 11 IU/L (ref 0–32)
AST SERPL-CCNC: 16 IU/L (ref 0–40)
BASOPHILS # BLD AUTO: 0 X10E3/UL (ref 0–0.2)
BASOPHILS NFR BLD AUTO: 1 %
BILIRUB SERPL-MCNC: 0.3 MG/DL (ref 0–1.2)
BUN SERPL-MCNC: 9 MG/DL (ref 6–20)
BUN/CREAT SERPL: 11 (ref 9–23)
CALCIUM SERPL-MCNC: 9.6 MG/DL (ref 8.7–10.2)
CHLORIDE SERPL-SCNC: 105 MMOL/L (ref 96–106)
CHOLEST SERPL-MCNC: 163 MG/DL (ref 100–199)
CO2 SERPL-SCNC: 24 MMOL/L (ref 20–29)
CREAT SERPL-MCNC: 0.82 MG/DL (ref 0.57–1)
EGFRCR SERPLBLD CKD-EPI 2021: 100 ML/MIN/1.73
EOSINOPHIL # BLD AUTO: 0 X10E3/UL (ref 0–0.4)
EOSINOPHIL NFR BLD AUTO: 1 %
ERYTHROCYTE [DISTWIDTH] IN BLOOD BY AUTOMATED COUNT: 13 % (ref 11.7–15.4)
GLOBULIN SER CALC-MCNC: 2.3 G/DL (ref 1.5–4.5)
GLUCOSE SERPL-MCNC: 84 MG/DL (ref 70–99)
HBA1C MFR BLD: 5.2 % (ref 4.8–5.6)
HCT VFR BLD AUTO: 46.3 % (ref 34–46.6)
HCV IGG SERPL QL IA: NON REACTIVE
HDLC SERPL-MCNC: 50 MG/DL
HGB BLD-MCNC: 15.3 G/DL (ref 11.1–15.9)
IMM GRANULOCYTES # BLD AUTO: 0 X10E3/UL (ref 0–0.1)
IMM GRANULOCYTES NFR BLD AUTO: 0 %
LDLC SERPL CALC-MCNC: 103 MG/DL (ref 0–99)
LYMPHOCYTES # BLD AUTO: 1.5 X10E3/UL (ref 0.7–3.1)
LYMPHOCYTES NFR BLD AUTO: 29 %
MCH RBC QN AUTO: 27.9 PG (ref 26.6–33)
MCHC RBC AUTO-ENTMCNC: 33 G/DL (ref 31.5–35.7)
MCV RBC AUTO: 84 FL (ref 79–97)
MONOCYTES # BLD AUTO: 0.4 X10E3/UL (ref 0.1–0.9)
MONOCYTES NFR BLD AUTO: 8 %
NEUTROPHILS # BLD AUTO: 3.2 X10E3/UL (ref 1.4–7)
NEUTROPHILS NFR BLD AUTO: 61 %
PLATELET # BLD AUTO: 234 X10E3/UL (ref 150–450)
POTASSIUM SERPL-SCNC: 4.4 MMOL/L (ref 3.5–5.2)
PROT SERPL-MCNC: 6.7 G/DL (ref 6–8.5)
RBC # BLD AUTO: 5.49 X10E6/UL (ref 3.77–5.28)
SODIUM SERPL-SCNC: 142 MMOL/L (ref 134–144)
TRIGL SERPL-MCNC: 47 MG/DL (ref 0–149)
TSH SERPL DL<=0.005 MIU/L-ACNC: 1.59 UIU/ML (ref 0.45–4.5)
VLDLC SERPL CALC-MCNC: 10 MG/DL (ref 5–40)
WBC # BLD AUTO: 5.1 X10E3/UL (ref 3.4–10.8)

## 2023-06-16 NOTE — RESULT ENCOUNTER NOTE
Results reviewed. Stewart Nevarez,  Your labs are back and your LDL cholesterol is elevated. You do not need to start a medication for this. Please decrease your intake of dietary cholesterol (red meat, dairy products, greasy/fried foods). Regular exercise, about 150 minutes per week, will help also. Consider starting a Mediterranean Diet. All other labs are normal or unremarkable. Please let me know if you have questions or concerns regarding these results.   Thank you,  Donna

## 2023-07-24 ENCOUNTER — OFFICE VISIT (OUTPATIENT)
Dept: PRIMARY CARE CLINIC | Facility: CLINIC | Age: 28
End: 2023-07-24
Payer: COMMERCIAL

## 2023-07-24 VITALS
OXYGEN SATURATION: 99 % | HEART RATE: 74 BPM | BODY MASS INDEX: 27.16 KG/M2 | DIASTOLIC BLOOD PRESSURE: 88 MMHG | RESPIRATION RATE: 18 BRPM | SYSTOLIC BLOOD PRESSURE: 121 MMHG | WEIGHT: 163 LBS | TEMPERATURE: 97.5 F | HEIGHT: 65 IN

## 2023-07-24 DIAGNOSIS — R42 DIZZINESS: ICD-10-CM

## 2023-07-24 DIAGNOSIS — F60.3 BORDERLINE PERSONALITY DISORDER (HCC): ICD-10-CM

## 2023-07-24 DIAGNOSIS — E78.00 ELEVATED LDL CHOLESTEROL LEVEL: ICD-10-CM

## 2023-07-24 DIAGNOSIS — G43.109 MIGRAINE WITH AURA, NOT INTRACTABLE, WITHOUT STATUS MIGRAINOSUS: ICD-10-CM

## 2023-07-24 DIAGNOSIS — Z00.00 WELL WOMAN EXAM WITHOUT GYNECOLOGICAL EXAM: Primary | ICD-10-CM

## 2023-07-24 PROCEDURE — 99214 OFFICE O/P EST MOD 30 MIN: CPT

## 2023-07-24 ASSESSMENT — ENCOUNTER SYMPTOMS
DIARRHEA: 0
COUGH: 0
NAUSEA: 0
ABDOMINAL PAIN: 0
SHORTNESS OF BREATH: 0
TROUBLE SWALLOWING: 0
VOMITING: 0
VOICE CHANGE: 0
WHEEZING: 0
CONSTIPATION: 0
CHEST TIGHTNESS: 0

## 2023-07-24 ASSESSMENT — PATIENT HEALTH QUESTIONNAIRE - PHQ9
SUM OF ALL RESPONSES TO PHQ QUESTIONS 1-9: 0
1. LITTLE INTEREST OR PLEASURE IN DOING THINGS: 0
SUM OF ALL RESPONSES TO PHQ QUESTIONS 1-9: 0
SUM OF ALL RESPONSES TO PHQ9 QUESTIONS 1 & 2: 0
2. FEELING DOWN, DEPRESSED OR HOPELESS: 0

## 2023-10-24 ENCOUNTER — TELEPHONE (OUTPATIENT)
Age: 28
End: 2023-10-24

## 2023-10-24 NOTE — TELEPHONE ENCOUNTER
Re: [employer]    Rcvd continuation PA request from [employer]nejo rep. Pt has not been seen in orver a yr, provider is no longer with practice and no follow up appt is scheduled. Archived key.

## 2024-03-20 ENCOUNTER — NURSE TRIAGE (OUTPATIENT)
Dept: OTHER | Facility: CLINIC | Age: 29
End: 2024-03-20

## 2024-03-20 NOTE — TELEPHONE ENCOUNTER
Location of patient: [place]    Received call from Renée at [facility]; Patient with Red Flag Complaint requesting to establish care with [place].    Subjective: Caller states \"Cellulitis right hand, was seen at [facility] the other day but now other fingers are swollen\"     Current Symptoms: On Augmentin and Bactrim.  Has had one pill of each. Right little finger started it now all of her fingers are swollen and red.  This exact same thing happened in February.  Hx eczema . Redness is on whole finger.      Onset: 1 day ago; sudden, worse    Pain Severity: 1/10; N/A; none    Temperature: denies by unknown method    What has been tried: abx    Recommended disposition: See PCP within 24 Hours- patient aware to seek care in clinic or urgent care if provider not able to see her within 24 hours.      Care advice provided, patient verbalizes understanding; denies any other questions or concerns; instructed to call back for any new or worsening symptoms.    Patient/Caller agrees with recommended disposition; writer provided warm transfer to [facility] at [facility] for appointment scheduling- she will call back in AM to schedule appt to establish care.     Attention Provider:  Thank you for allowing me to participate in the care of your patient.  The patient was connected to triage in response to information provided to the [facility].  Please do not respond through this encounter as the response is not directed to a shared pool.    Reason for Disposition   [1] Taking antibiotic < 24 hours AND [2] cellulitis symptoms are WORSE (e.g., spreading redness, pain, swelling, drainage) AND [3] no fever    Protocols used: Cellulitis on Antibiotic Follow-up Call-ADULT-

## 2024-03-29 ENCOUNTER — TELEPHONE (OUTPATIENT)
Dept: PRIMARY CARE CLINIC | Facility: CLINIC | Age: 29
End: 2024-03-29

## 2024-03-29 NOTE — TELEPHONE ENCOUNTER
Pt ([phone]) would like to get lab orders for blood work to test for autoimmune disorder (rheumatoid arthritis) after MRI on Wednesday. She would like to know the order has to come from her PCP or the rheumatology department?    Please assist with this matter.     JU HARTMAN

## 2024-04-05 ENCOUNTER — TELEPHONE (OUTPATIENT)
Age: 29
End: 2024-04-05

## 2024-04-05 NOTE — TELEPHONE ENCOUNTER
LVM for patient to call us back to schedule NP elizabeth with Alysha Tao, per provider ok to schedule next available NP elizabeth

## 2024-04-12 ENCOUNTER — APPOINTMENT (OUTPATIENT)
Facility: HOSPITAL | Age: 29
End: 2024-04-12
Payer: COMMERCIAL

## 2024-04-12 ENCOUNTER — HOSPITAL ENCOUNTER (EMERGENCY)
Facility: HOSPITAL | Age: 29
Discharge: HOME | End: 2024-04-13
Attending: EMERGENCY MEDICINE
Payer: COMMERCIAL

## 2024-04-12 VITALS
TEMPERATURE: 98.8 F | RESPIRATION RATE: 18 BRPM | OXYGEN SATURATION: 100 % | SYSTOLIC BLOOD PRESSURE: 134 MMHG | DIASTOLIC BLOOD PRESSURE: 78 MMHG | HEIGHT: 65 IN | BODY MASS INDEX: 26.66 KG/M2 | HEART RATE: 98 BPM | WEIGHT: 160 LBS

## 2024-04-12 DIAGNOSIS — S92.351A CLOSED DISPLACED FRACTURE OF FIFTH METATARSAL BONE OF RIGHT FOOT, INITIAL ENCOUNTER: Primary | ICD-10-CM

## 2024-04-12 PROCEDURE — 6370000000 HC RX 637 (ALT 250 FOR IP)

## 2024-04-12 PROCEDURE — 73630 X-RAY EXAM OF FOOT: CPT

## 2024-04-12 PROCEDURE — 99284 EMERGENCY DEPT VISIT MOD MDM: CPT

## 2024-04-12 PROCEDURE — 73610 X-RAY EXAM OF ANKLE: CPT

## 2024-04-12 PROCEDURE — 96372 THER/PROPH/DIAG INJ SC/IM: CPT

## 2024-04-12 PROCEDURE — 6360000002 HC RX W HCPCS

## 2024-04-12 RX ORDER — HYDROCODONE BITARTRATE AND ACETAMINOPHEN 5; 325 MG/1; MG/1
1 TABLET ORAL
Status: COMPLETED | OUTPATIENT
Start: 2024-04-12 | End: 2024-04-12

## 2024-04-12 RX ORDER — OXYCODONE HYDROCHLORIDE 5 MG/1
5 TABLET ORAL EVERY 6 HOURS PRN
Qty: 12 TABLET | Refills: 0 | Status: SHIPPED | OUTPATIENT
Start: 2024-04-12 | End: 2024-04-15

## 2024-04-12 RX ORDER — KETOROLAC TROMETHAMINE 30 MG/ML
30 INJECTION, SOLUTION INTRAMUSCULAR; INTRAVENOUS
Status: COMPLETED | OUTPATIENT
Start: 2024-04-12 | End: 2024-04-12

## 2024-04-12 RX ADMIN — KETOROLAC TROMETHAMINE 30 MG: 30 INJECTION, SOLUTION INTRAMUSCULAR at 20:19

## 2024-04-12 RX ADMIN — HYDROCODONE BITARTRATE AND ACETAMINOPHEN 1 TABLET: 5; 325 TABLET ORAL at 21:42

## 2024-04-12 ASSESSMENT — PAIN SCALES - GENERAL: PAINLEVEL_OUTOF10: 6

## 2024-04-12 ASSESSMENT — PAIN - FUNCTIONAL ASSESSMENT: PAIN_FUNCTIONAL_ASSESSMENT: 0-10

## 2024-04-12 ASSESSMENT — PAIN DESCRIPTION - ORIENTATION: ORIENTATION: RIGHT

## 2024-04-12 ASSESSMENT — PAIN DESCRIPTION - LOCATION: LOCATION: FOOT

## 2024-04-12 ASSESSMENT — PAIN DESCRIPTION - DESCRIPTORS: DESCRIPTORS: SHARP

## 2024-04-12 NOTE — ED TRIAGE NOTES
Patient arrives to ed via pov with c/o right foot pain after tripping letting dog outside. Pt sts she tripped on hose outside. Pt denies LOC or any further injuries.     PMS intact to right foot.

## 2024-04-12 NOTE — ED PROVIDER NOTES
[facility] EMERGENCY DEPT  EMERGENCY DEPARTMENT ENCOUNTER      Date: 4/12/2024  Patient Name: Nilesh Rodrigeuz  MRN: [id number]  Birthdate 1995  Date of evaluation: 4/12/2024  Provider: NISREEN Nguyễn NP   Note Started: 7:37 PM EDT 4/12/24    CHIEF COMPLAINT     Chief Complaint   Patient presents with    Ankle Pain       HISTORY OF PRESENT ILLNESS  (Onset, Location, Duration, Character, Alleviating/Aggravating, Radiation, Timing, Severity)   Note limiting factors.   History Provided By: Patient and roommate    HPI: Nilesh Rodriguez is a 28 y.o. adult female with history of anxiety, depression, and additional as reviewed below presents with right ankle pain.  Patient states she took the dog out today when she stepped on something causing her to roll her ankle.  She denies loss of consciousness, head injury, or other complaint.  No pain medication prior to arrival.  She complains of some tingling sensation in the right toes.  She denies calf pain, history of blood clot, fever, open wounds.  No surgical history to the joint.    Nursing Notes and triage vitals were reviewed.  PCP: Donna Long APRN - NP      PAST MEDICAL HISTORY   Past Medical History:  Past Medical History:   Diagnosis Date    Anxiety     Borderline personality disorder (HCC) 04/2019    Chronic back pain     Concussion 2016    Depression     Headache     Pap smear for cervical cancer screening 01/05/2023    normal       Past Surgical History:  Past Surgical History:   Procedure Laterality Date    WISDOM TOOTH EXTRACTION  2011       Family History:  Family History   Problem Relation Age of Onset    Depression Mother     Anxiety Disorder Mother     Elevated Lipids Mother     Other Mother         Gestational Diabetes    Herniated Disc Father     Osteoarthritis Father     Alcohol Abuse Father     Attention Deficit Disorder Sister     Depression Sister        Social History:  Social History     Tobacco Use    Smoking status: Never     recognition software. Quite often unanticipated grammatical, syntax, homophones, and other interpretive errors are inadvertently transcribed by the computer software. Efforts were made to edit the dictation but occasionally words remain mis-transcribed.)    NISREEN Nguyễn NP (electronically signed)  Emergency Attending Physician / Physician Assistant / Nurse Practitioner     Aranza Camarena, NISREEN Manley NP  04/12/24 2990

## 2024-04-13 PROCEDURE — 2700000000 HC OXYGEN THERAPY PER DAY

## 2024-04-13 NOTE — DISCHARGE INSTRUCTIONS
Thank you for allowing us to provide you with medical care today.  We realize that you have many choices for your emergency care needs.  We thank you for choosing [facility].  Please choose us in the future for any continued health care needs.     We hope we addressed all of your medical concerns. We strive to provide excellent quality care in the Emergency Department.  Anything less than excellent does not meet our expectations.     The exam and treatment you received in the Emergency Department were for an emergent problem and are not intended as complete care. It is important that you follow up with a doctor, nurse practitioner, or physician’s assistant for ongoing care. If your symptoms worsen or you do not improve as expected and you are unable to reach your usual health care provider, you should return to the Emergency Department. We are available 24 hours a day.     Take this sheet with you when you go to your follow-up visit.     If you have any problem arranging the follow-up visit, contact the Emergency Department immediately.     Make an appointment your family doctor for follow up of this visit. Return to the ER if you are unable to be seen in a timely manner.     Below is a summary of your results.    Labs  No results found for this or any previous visit (from the past 12 hour(s)).    Radiologic Studies  XR FOOT RIGHT (MIN 3 VIEWS)   Final Result   Mildly displaced acute avulsion fracture of the base of the fifth   metatarsal..      XR ANKLE RIGHT (MIN 3 VIEWS)   Final Result   Mildly displaced acute avulsion fracture of the base of the fifth   metatarsal..

## 2024-04-22 ENCOUNTER — OFFICE VISIT (OUTPATIENT)
Age: 29
End: 2024-04-22
Payer: COMMERCIAL

## 2024-04-22 VITALS — WEIGHT: 162 LBS | BODY MASS INDEX: 26.96 KG/M2 | SYSTOLIC BLOOD PRESSURE: 133 MMHG | DIASTOLIC BLOOD PRESSURE: 97 MMHG

## 2024-04-22 DIAGNOSIS — Z01.419 ENCOUNTER FOR GYNECOLOGICAL EXAMINATION: Primary | ICD-10-CM

## 2024-04-22 DIAGNOSIS — N89.8 VAGINAL ITCHING: ICD-10-CM

## 2024-04-22 PROCEDURE — 99395 PREV VISIT EST AGE 18-39: CPT | Performed by: OBSTETRICS & GYNECOLOGY

## 2024-04-22 RX ORDER — ACETAMINOPHEN AND CODEINE PHOSPHATE 120; 12 MG/5ML; MG/5ML
1 SOLUTION ORAL DAILY
Qty: 90 TABLET | Refills: 4 | Status: SHIPPED | OUTPATIENT
Start: 2024-04-22

## 2024-04-22 NOTE — PROGRESS NOTES
Nilesh Rodriguez is a 28 y.o. adult returns for an annual exam     Chief Complaint   Patient presents with    Annual Exam       Patient's last menstrual period was 04/04/2024.  Her periods are moderate in flow and usually regular with a 26-32 day interval with 3-7 day duration.  She does not have dysmenorrhea.  Problems: problems - vaginal itching one and off for the past year  Birth Control: none.  Last Pap: see report obtained 1 year(s) ago.  She does not have a history of ERNA 2, 3 or cervical cancer.   With regard to the Gardisil vaccine, she has received 3     Have you been to the ER, urgent care clinic, or hospitalized since your last visit? No    2. Have you seen or consulted any other health care providers outside of the [facility] since your last visit? No    Examination chaperoned by Gala Cobian LPN.  
Housing Stability Vital Sign     Unable to Pay for Housing in the Last Year: Not on file     Number of Places Lived in the Last Year: Not on file     Unstable Housing in the Last Year: No     Tobacco History:  reports that Nilesh Rodriguez \"Vikasyce\" has never smoked. Nilesh Rodriguez \"Vikasyce\" has never used smokeless tobacco.  Alcohol Abuse:  reports no history of alcohol use.  Drug Abuse:  reports no history of drug use.  Allergies: No Known Allergies  There is no problem list on file for this patient.      Review of Systems - History obtained from the patient and patient filled out questionnaire   Constitutional/general, HEENT, CV, Resp, GI, MSK, Neuro, Psych, Heme/lymph, Skin, Breast ROS: no significant complaints except as noted on HPI    Physical Exam  BP (!) 133/97   Wt 73.5 kg (162 lb)   LMP 04/04/2024   BMI 26.96 kg/m²     Constitutional  Appearance: alert, in no acute distress    HENT  Head and Face: appears normal    Neck  Inspection/Palpation: normal appearance    Breasts  Inspection of Breasts: breasts symmetrical, no skin changes, no discharge present, nipple appearance normal, no skin retraction present  Palpation of Breasts and Axillae: no masses present on palpation, no breast tenderness  Axillary Lymph Nodes: no lymphadenopathy present    Gastrointestinal  Abdominal Examination: abdomen non-tender to palpation, no masses present    Genitourinary bme deferred  External Genitalia: normal appearance for age, scant erythema in labia folds, no focal lesions  Vagina: normal vaginal vault  Bladder: non-tender to palpation  Urethra: appears normal  Perineum: normal appearing  Anus: normal appearing    Skin  General Inspection: no significant rash    Neurologic/Psychiatric  Mental Status:  Orientation: grossly oriented and alert  Mood and Affect: mood normal, affect appropriate    Assessment:  28 y.o. No obstetric history on file. for annual gynecologic exam.  Encounter Diagnoses   Name Primary?    Encounter

## 2024-04-26 LAB
A VAGINAE DNA VAG QL NAA+PROBE: NORMAL SCORE
BVAB2 DNA VAG QL NAA+PROBE: NORMAL SCORE
C ALBICANS DNA VAG QL NAA+PROBE: NORMAL
C GLABRATA DNA VAG QL NAA+PROBE: NORMAL
MEGA1 DNA VAG QL NAA+PROBE: NORMAL SCORE
T VAGINALIS DNA VAG QL NAA+PROBE: NEGATIVE

## 2024-04-26 NOTE — RESULT ENCOUNTER NOTE
Abnormal results   message sent if active on MC.  Notify patient if not on MC or message not read within two weeks.  Call or send letter and document in chart.  Rec repeat if having sx or can observe

## 2024-05-28 ENCOUNTER — HOSPITAL ENCOUNTER (OUTPATIENT)
Facility: HOSPITAL | Age: 29
Discharge: HOME | End: 2024-05-31
Payer: COMMERCIAL

## 2024-05-28 ENCOUNTER — OFFICE VISIT (OUTPATIENT)
Age: 29
End: 2024-05-28
Payer: COMMERCIAL

## 2024-05-28 VITALS
RESPIRATION RATE: 16 BRPM | WEIGHT: 160 LBS | TEMPERATURE: 98.5 F | HEART RATE: 109 BPM | DIASTOLIC BLOOD PRESSURE: 87 MMHG | BODY MASS INDEX: 26.63 KG/M2 | SYSTOLIC BLOOD PRESSURE: 115 MMHG | OXYGEN SATURATION: 96 %

## 2024-05-28 DIAGNOSIS — M79.605 PAIN IN BOTH LOWER EXTREMITIES: ICD-10-CM

## 2024-05-28 DIAGNOSIS — M79.604 PAIN IN BOTH LOWER EXTREMITIES: ICD-10-CM

## 2024-05-28 DIAGNOSIS — G89.29 CHRONIC BILATERAL LOW BACK PAIN WITHOUT SCIATICA: ICD-10-CM

## 2024-05-28 DIAGNOSIS — M54.50 CHRONIC BILATERAL LOW BACK PAIN WITHOUT SCIATICA: ICD-10-CM

## 2024-05-28 DIAGNOSIS — G89.29 CHRONIC FOOT PAIN, RIGHT: Primary | ICD-10-CM

## 2024-05-28 DIAGNOSIS — G89.29 CHRONIC FOOT PAIN, RIGHT: ICD-10-CM

## 2024-05-28 DIAGNOSIS — M79.641 RIGHT HAND PAIN: ICD-10-CM

## 2024-05-28 DIAGNOSIS — M79.671 CHRONIC FOOT PAIN, RIGHT: ICD-10-CM

## 2024-05-28 DIAGNOSIS — M79.671 CHRONIC FOOT PAIN, RIGHT: Primary | ICD-10-CM

## 2024-05-28 PROCEDURE — 73630 X-RAY EXAM OF FOOT: CPT

## 2024-05-28 PROCEDURE — 73560 X-RAY EXAM OF KNEE 1 OR 2: CPT

## 2024-05-28 PROCEDURE — 72202 X-RAY EXAM SI JOINTS 3/> VWS: CPT

## 2024-05-28 PROCEDURE — 73610 X-RAY EXAM OF ANKLE: CPT

## 2024-05-28 PROCEDURE — 99203 OFFICE O/P NEW LOW 30 MIN: CPT | Performed by: NURSE PRACTITIONER

## 2024-05-28 PROCEDURE — 73130 X-RAY EXAM OF HAND: CPT

## 2024-05-28 ASSESSMENT — ENCOUNTER SYMPTOMS
DIARRHEA: 0
EYE PAIN: 0
COLOR CHANGE: 1
ABDOMINAL PAIN: 1
VOMITING: 0
CONSTIPATION: 1
TROUBLE SWALLOWING: 0
EYE REDNESS: 0
SHORTNESS OF BREATH: 1
COUGH: 0
BLOOD IN STOOL: 0
NAUSEA: 1
SORE THROAT: 0

## 2024-05-28 ASSESSMENT — PATIENT HEALTH QUESTIONNAIRE - PHQ9
SUM OF ALL RESPONSES TO PHQ QUESTIONS 1-9: 1
2. FEELING DOWN, DEPRESSED OR HOPELESS: SEVERAL DAYS
SUM OF ALL RESPONSES TO PHQ9 QUESTIONS 1 & 2: 1
1. LITTLE INTEREST OR PLEASURE IN DOING THINGS: NOT AT ALL
SUM OF ALL RESPONSES TO PHQ QUESTIONS 1-9: 1

## 2024-05-28 NOTE — PROGRESS NOTES
Nilesh Rodriguez (:  1995) is a 28 y.o. adult      Subjective   HPI  Patient is a 28 year old female being seen at the request of Dr. Blanca Toure for the evaluation of chronic foot and toe pain. She reports a swollen right 4th toe for the last two years. It happened after she tripped over something, but then the swelling and pain persisted. She reports right hand tendinitis for a year and she reports shin pain late March of this year that happens during her menstrual cycle, and right pinky pain and swelling at the end of the April that resolved with a compression glove. She has received no treatment for her pain. She takes Tylenol 2 pills daily and Ibuprofen 400mg prn. She's currently in a boot because she broke her right 5th metatarsal after tripping over a hose and she required a plate. She reports chronic back pain since injuring herself lifting something several years ago. She reports fatigue. She has morning stiffness that lasts 5-10 minutes.She denies a family history of autoimmune disease; her dad has hand OA. She reports in February and March she received antibiotics for cellulitis in her right hand.       Review of Systems   Constitutional:  Positive for fatigue. Negative for chills and fever.   HENT:  Negative for mouth sores, sore throat and trouble swallowing.         Denies nasals sores  Denies dry mouth   Eyes:  Negative for pain and redness.        Denies dry eyes   Respiratory:  Positive for shortness of breath (with stairs that resolves with rest). Negative for cough.    Cardiovascular:  Negative for chest pain.   Gastrointestinal:  Positive for abdominal pain, constipation (with her menstrual cycle) and nausea (in the morning). Negative for blood in stool, diarrhea and vomiting.   Genitourinary:  Negative for dysuria and hematuria.   Musculoskeletal:  Negative for arthralgias and joint swelling.   Skin:  Positive for color change (fingers and toes turn blue in the cold). Negative for

## 2024-05-28 NOTE — PROGRESS NOTES
Chief Complaint   Patient presents with    New Patient     1. Have you been to the ER, urgent care clinic since your last visit?  Hospitalized since your last visit?No    2. Have you seen or consulted any other health care providers outside of the [facility] since your last visit?  Include any pap smears or colon screening. No

## 2024-05-29 LAB
25(OH)D3+25(OH)D2 SERPL-MCNC: 33.5 NG/ML (ref 30–100)
ALBUMIN SERPL-MCNC: 4.5 G/DL (ref 4–5)
ALBUMIN/GLOB SERPL: 1.9 {RATIO} (ref 1.2–2.2)
ALP SERPL-CCNC: 57 IU/L (ref 44–121)
ALT SERPL-CCNC: 12 IU/L (ref 0–32)
AST SERPL-CCNC: 16 IU/L (ref 0–40)
BASOPHILS # BLD AUTO: 0 X10E3/UL (ref 0–0.2)
BASOPHILS NFR BLD AUTO: 0 %
BILIRUB SERPL-MCNC: 0.4 MG/DL (ref 0–1.2)
BUN SERPL-MCNC: 4 MG/DL (ref 6–20)
BUN/CREAT SERPL: 5 (ref 9–23)
CALCIUM SERPL-MCNC: 9.6 MG/DL (ref 8.7–10.2)
CCP IGA+IGG SERPL IA-ACNC: 5 UNITS (ref 0–19)
CHLORIDE SERPL-SCNC: 102 MMOL/L (ref 96–106)
CO2 SERPL-SCNC: 23 MMOL/L (ref 20–29)
CREAT SERPL-MCNC: 0.81 MG/DL (ref 0.57–1)
CRP SERPL-MCNC: 3 MG/L (ref 0–10)
EGFRCR SERPLBLD CKD-EPI 2021: 101 ML/MIN/1.73
ENA RNP AB SER-ACNC: 0.8 AI (ref 0–0.9)
ENA SM AB SER-ACNC: <0.2 AI (ref 0–0.9)
ENA SS-A AB SER-ACNC: <0.2 AI (ref 0–0.9)
ENA SS-B AB SER-ACNC: <0.2 AI (ref 0–0.9)
EOSINOPHIL # BLD AUTO: 0.1 X10E3/UL (ref 0–0.4)
EOSINOPHIL NFR BLD AUTO: 2 %
ERYTHROCYTE [DISTWIDTH] IN BLOOD BY AUTOMATED COUNT: 12.5 % (ref 11.7–15.4)
ERYTHROCYTE [SEDIMENTATION RATE] IN BLOOD BY WESTERGREN METHOD: 5 MM/HR (ref 0–32)
GLOBULIN SER CALC-MCNC: 2.4 G/DL (ref 1.5–4.5)
GLUCOSE SERPL-MCNC: 95 MG/DL (ref 70–99)
HCT VFR BLD AUTO: 46.8 % (ref 34–46.6)
HGB BLD-MCNC: 15.6 G/DL (ref 11.1–15.9)
IMM GRANULOCYTES # BLD AUTO: 0 X10E3/UL (ref 0–0.1)
IMM GRANULOCYTES NFR BLD AUTO: 0 %
LYMPHOCYTES # BLD AUTO: 1.3 X10E3/UL (ref 0.7–3.1)
LYMPHOCYTES NFR BLD AUTO: 21 %
MCH RBC QN AUTO: 29 PG (ref 26.6–33)
MCHC RBC AUTO-ENTMCNC: 33.3 G/DL (ref 31.5–35.7)
MCV RBC AUTO: 87 FL (ref 79–97)
MONOCYTES # BLD AUTO: 0.4 X10E3/UL (ref 0.1–0.9)
MONOCYTES NFR BLD AUTO: 6 %
NEUTROPHILS # BLD AUTO: 4.3 X10E3/UL (ref 1.4–7)
NEUTROPHILS NFR BLD AUTO: 71 %
PLATELET # BLD AUTO: 284 X10E3/UL (ref 150–450)
POTASSIUM SERPL-SCNC: 4.5 MMOL/L (ref 3.5–5.2)
PROT SERPL-MCNC: 6.9 G/DL (ref 6–8.5)
RBC # BLD AUTO: 5.38 X10E6/UL (ref 3.77–5.28)
SODIUM SERPL-SCNC: 139 MMOL/L (ref 134–144)
URATE SERPL-MCNC: 3.9 MG/DL (ref 2.6–6.2)
WBC # BLD AUTO: 6.2 X10E3/UL (ref 3.4–10.8)

## 2024-05-30 LAB
ANA NUCLEAR DOTS TITR SER IF: ABNORMAL {TITER}
ANA SER QL IF: POSITIVE
ANA SPECKLED TITR SER: ABNORMAL {TITER}
G6PD BLD QN: 250 U/10E12 RBC (ref 155–399)
Lab: ABNORMAL

## 2024-06-01 LAB — RHEUMATOID FACT SERPL-ACNC: <14 IU/ML

## 2024-06-03 LAB — HLA-B27 QL NAA+PROBE: NEGATIVE

## 2024-06-04 NOTE — PATIENT INSTRUCTIONS
Thank you for visiting [facility]!      For future medication refills, we require updated lab results and an upcoming appointment to be in our system prior to refilling prescriptions.  Without these two things, your refill will be DENIED.  If you miss your upcoming appointment, your refill will also be DENIED.      We appreciate your understanding of this critical clinical aspect of our practice. -Dr. Bal & Alysha, NP    Start Plaquenil 300mg daily, that is 1.5 pills daily.     Please schedule Plaquenil eye exam with your eye doctor.    I will order the MRI. If you don't get a phone call with a week, please call [phone].    Return to the clinic in 2 months.    Please get labs drawn 1 week prior to your visit.     Be sure to wear sunscreen daily to all sun exposed areas.

## 2024-06-07 LAB — ENA SCL70 AB SER QL IA: <20 UNITS

## 2024-06-14 ENCOUNTER — OFFICE VISIT (OUTPATIENT)
Age: 29
End: 2024-06-14
Payer: COMMERCIAL

## 2024-06-14 VITALS
RESPIRATION RATE: 16 BRPM | HEART RATE: 105 BPM | OXYGEN SATURATION: 96 % | BODY MASS INDEX: 26.86 KG/M2 | WEIGHT: 161.4 LBS | SYSTOLIC BLOOD PRESSURE: 116 MMHG | TEMPERATURE: 97.9 F | DIASTOLIC BLOOD PRESSURE: 84 MMHG

## 2024-06-14 DIAGNOSIS — R93.89 ABNORMAL X-RAY: ICD-10-CM

## 2024-06-14 DIAGNOSIS — M19.90 INFLAMMATORY ARTHRITIS: ICD-10-CM

## 2024-06-14 DIAGNOSIS — R76.8 POSITIVE ANA (ANTINUCLEAR ANTIBODY): Primary | ICD-10-CM

## 2024-06-14 DIAGNOSIS — G89.29 CHRONIC BILATERAL LOW BACK PAIN WITHOUT SCIATICA: ICD-10-CM

## 2024-06-14 DIAGNOSIS — Z51.81 MEDICATION MONITORING ENCOUNTER: ICD-10-CM

## 2024-06-14 DIAGNOSIS — M54.50 CHRONIC BILATERAL LOW BACK PAIN WITHOUT SCIATICA: ICD-10-CM

## 2024-06-14 PROCEDURE — 99214 OFFICE O/P EST MOD 30 MIN: CPT | Performed by: NURSE PRACTITIONER

## 2024-06-14 RX ORDER — HYDROXYCHLOROQUINE SULFATE 200 MG/1
300 TABLET, FILM COATED ORAL DAILY
Qty: 135 TABLET | Refills: 0 | Status: SHIPPED | OUTPATIENT
Start: 2024-06-14 | End: 2024-09-12

## 2024-06-14 ASSESSMENT — ENCOUNTER SYMPTOMS
COUGH: 0
DIARRHEA: 0
EYE REDNESS: 0
CONSTIPATION: 0
VOMITING: 0
TROUBLE SWALLOWING: 0
ABDOMINAL PAIN: 1
SORE THROAT: 0
SHORTNESS OF BREATH: 0
NAUSEA: 0
BLOOD IN STOOL: 0
EYE PAIN: 0

## 2024-06-14 ASSESSMENT — PATIENT HEALTH QUESTIONNAIRE - PHQ9
SUM OF ALL RESPONSES TO PHQ9 QUESTIONS 1 & 2: 1
SUM OF ALL RESPONSES TO PHQ QUESTIONS 1-9: 1
SUM OF ALL RESPONSES TO PHQ QUESTIONS 1-9: 1
2. FEELING DOWN, DEPRESSED OR HOPELESS: SEVERAL DAYS
SUM OF ALL RESPONSES TO PHQ QUESTIONS 1-9: 1
SUM OF ALL RESPONSES TO PHQ QUESTIONS 1-9: 1
1. LITTLE INTEREST OR PLEASURE IN DOING THINGS: NOT AT ALL

## 2024-06-14 NOTE — PROGRESS NOTES
Nilesh Rodriguez (:  1995) is a 28 y.o. adult    2024 HERO 1:320 Speckled, 1:640 Nuclear dot, SSA <0.2, SSB <0.2, RNP 0.8, Hough <0.2, Scl 70 <20, CCP 5, RF <14, HLA B27 negative    Subjective   HPI  Patient is a 28 year old female here for a follow up visit for a positive HERO. We reviewed her lab and imaging results. She reports she has been having right 5th finger swelling. She denies pain or swelling elsewhere. She has morning stiffness that lasts 5 minutes. She's currently in a boot because she broke her right 5th metatarsal after tripping over a hose and she required a plate. She is hopeful next Thursday she'll get be able to remove the boot. She reports chronic back pain since injuring herself lifting something several years ago. She denies infections or antibiotics since her last visit.     Review of Systems   Constitutional:  Negative for chills and fever.   HENT:  Negative for mouth sores, sore throat and trouble swallowing.         Denies nasals sores  Denies dry mouth   Eyes:  Negative for pain and redness.        Denies dry eyes   Respiratory:  Negative for cough and shortness of breath.    Cardiovascular:  Negative for chest pain.   Gastrointestinal:  Positive for abdominal pain (GERD). Negative for blood in stool, constipation, diarrhea, nausea and vomiting.        Appointment with GI on Monday.   Genitourinary:  Negative for dysuria and hematuria.   Musculoskeletal:  Negative for arthralgias and joint swelling.   Skin:  Negative for rash.     Current Outpatient Medications   Medication Sig Dispense Refill    norethindrone (MICRONOR) 0.35 MG tablet Take 1 tablet by mouth daily 90 tablet 4    amoxicillin-clavulanate (AUGMENTIN) 500-125 MG per tablet  (Patient not taking: Reported on 2024)      clobetasol (TEMOVATE) 0.05 % cream APPLY TO AFFECTED AREA OF HANDS TWICE DAILY FOR UP TO 2 WEEK AS NEEDED (Patient not taking: Reported on 2024)       No current facility-administered

## 2024-06-25 ENCOUNTER — HOSPITAL ENCOUNTER (OUTPATIENT)
Facility: HOSPITAL | Age: 29
Discharge: HOME | End: 2024-06-28
Payer: COMMERCIAL

## 2024-06-25 DIAGNOSIS — R11.0 NAUSEA: ICD-10-CM

## 2024-06-25 PROCEDURE — 76700 US EXAM ABDOM COMPLETE: CPT

## 2024-06-28 ENCOUNTER — HOSPITAL ENCOUNTER (OUTPATIENT)
Facility: HOSPITAL | Age: 29
Discharge: HOME | End: 2024-06-28
Payer: COMMERCIAL

## 2024-06-28 VITALS — BODY MASS INDEX: 26.79 KG/M2 | WEIGHT: 161 LBS

## 2024-06-28 DIAGNOSIS — M54.50 CHRONIC BILATERAL LOW BACK PAIN WITHOUT SCIATICA: ICD-10-CM

## 2024-06-28 DIAGNOSIS — G89.29 CHRONIC BILATERAL LOW BACK PAIN WITHOUT SCIATICA: ICD-10-CM

## 2024-06-28 DIAGNOSIS — R93.89 ABNORMAL X-RAY: ICD-10-CM

## 2024-06-28 PROCEDURE — 6360000004 HC RX CONTRAST MEDICATION: Performed by: RADIOLOGY

## 2024-06-28 PROCEDURE — A9579 GAD-BASE MR CONTRAST NOS,1ML: HCPCS | Performed by: RADIOLOGY

## 2024-06-28 PROCEDURE — 72197 MRI PELVIS W/O & W/DYE: CPT

## 2024-06-28 RX ADMIN — GADOTERIDOL 14 ML: 279.3 INJECTION, SOLUTION INTRAVENOUS at 14:41

## 2024-07-30 NOTE — PATIENT INSTRUCTIONS
Thank you for visiting [facility]!      For future medication refills, we require updated lab results and an upcoming appointment to be in our system prior to refilling prescriptions.  Without these two things, your refill will be DENIED.  If you miss your upcoming appointment, your refill will also be DENIED.      We appreciate your understanding of this critical clinical aspect of our practice. -Dr. Bal & Alysha, NP     Restart Plaquenil 200mg daily.    Start Meloxicam 7.5mg daily with food.     Start physical therapy for your low back pain.     Return to the clinic in 2 months.

## 2024-08-06 LAB
ALBUMIN SERPL-MCNC: 4.5 G/DL (ref 4–5)
ALP SERPL-CCNC: 59 IU/L (ref 44–121)
ALT SERPL-CCNC: 16 IU/L (ref 0–32)
AST SERPL-CCNC: 20 IU/L (ref 0–40)
BASOPHILS # BLD AUTO: 0 X10E3/UL (ref 0–0.2)
BASOPHILS NFR BLD AUTO: 0 %
BILIRUB SERPL-MCNC: 0.4 MG/DL (ref 0–1.2)
BUN SERPL-MCNC: 8 MG/DL (ref 6–20)
BUN/CREAT SERPL: 11 (ref 9–23)
CALCIUM SERPL-MCNC: 9.7 MG/DL (ref 8.7–10.2)
CHLORIDE SERPL-SCNC: 105 MMOL/L (ref 96–106)
CO2 SERPL-SCNC: 25 MMOL/L (ref 20–29)
CREAT SERPL-MCNC: 0.7 MG/DL (ref 0.57–1)
CRP SERPL-MCNC: <1 MG/L (ref 0–10)
EGFRCR SERPLBLD CKD-EPI 2021: 120 ML/MIN/1.73
EOSINOPHIL # BLD AUTO: 0.2 X10E3/UL (ref 0–0.4)
EOSINOPHIL NFR BLD AUTO: 3 %
ERYTHROCYTE [DISTWIDTH] IN BLOOD BY AUTOMATED COUNT: 12.1 % (ref 11.7–15.4)
ERYTHROCYTE [SEDIMENTATION RATE] IN BLOOD BY WESTERGREN METHOD: 2 MM/HR (ref 0–32)
GLOBULIN SER CALC-MCNC: 2.6 G/DL (ref 1.5–4.5)
GLUCOSE SERPL-MCNC: 83 MG/DL (ref 70–99)
HCT VFR BLD AUTO: 48.6 % (ref 34–46.6)
HGB BLD-MCNC: 15.8 G/DL (ref 11.1–15.9)
IMM GRANULOCYTES # BLD AUTO: 0 X10E3/UL (ref 0–0.1)
IMM GRANULOCYTES NFR BLD AUTO: 0 %
LYMPHOCYTES # BLD AUTO: 1.8 X10E3/UL (ref 0.7–3.1)
LYMPHOCYTES NFR BLD AUTO: 30 %
MCH RBC QN AUTO: 27.9 PG (ref 26.6–33)
MCHC RBC AUTO-ENTMCNC: 32.5 G/DL (ref 31.5–35.7)
MCV RBC AUTO: 86 FL (ref 79–97)
MONOCYTES # BLD AUTO: 0.3 X10E3/UL (ref 0.1–0.9)
MONOCYTES NFR BLD AUTO: 5 %
NEUTROPHILS # BLD AUTO: 3.6 X10E3/UL (ref 1.4–7)
NEUTROPHILS NFR BLD AUTO: 62 %
PLATELET # BLD AUTO: 257 X10E3/UL (ref 150–450)
POTASSIUM SERPL-SCNC: 4.4 MMOL/L (ref 3.5–5.2)
PROT SERPL-MCNC: 7.1 G/DL (ref 6–8.5)
RBC # BLD AUTO: 5.66 X10E6/UL (ref 3.77–5.28)
SODIUM SERPL-SCNC: 145 MMOL/L (ref 134–144)
WBC # BLD AUTO: 5.9 X10E3/UL (ref 3.4–10.8)

## 2024-08-12 ENCOUNTER — OFFICE VISIT (OUTPATIENT)
Age: 29
End: 2024-08-12
Payer: COMMERCIAL

## 2024-08-12 VITALS
WEIGHT: 165 LBS | HEART RATE: 105 BPM | HEIGHT: 65 IN | DIASTOLIC BLOOD PRESSURE: 80 MMHG | BODY MASS INDEX: 27.49 KG/M2 | SYSTOLIC BLOOD PRESSURE: 122 MMHG

## 2024-08-12 DIAGNOSIS — R10.2 PELVIC PAIN: Primary | ICD-10-CM

## 2024-08-12 DIAGNOSIS — N93.9 ABNORMAL UTERINE BLEEDING: ICD-10-CM

## 2024-08-12 DIAGNOSIS — R93.5 ABNORMAL MRI, PELVIS: ICD-10-CM

## 2024-08-12 DIAGNOSIS — D21.9 FIBROID: ICD-10-CM

## 2024-08-12 PROCEDURE — 99212 OFFICE O/P EST SF 10 MIN: CPT | Performed by: OBSTETRICS & GYNECOLOGY

## 2024-08-12 PROCEDURE — 99459 PELVIC EXAMINATION: CPT | Performed by: OBSTETRICS & GYNECOLOGY

## 2024-08-12 NOTE — PROGRESS NOTES
Rooming note, gyn problem visit:    Chief Complaint   Patient presents with    AUB, Cramping     Nilesh Rodriguez is a 28 y.o. adult presents for a problem visit.    Patient's last menstrual period was 08/01/2024 (approximate).  Birth Control: oral progesterone-only contraceptive and abstinence, never been SA.     Last or next WWE is: 04/22/2024    The patient is reporting having: on Micronor x years, had first breakthrough bleeding in July. Reports painful cramping before cycle starts. During cycle, cramping is minimal.   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     This is a new problem.  She has not experienced this problem before.    She reports the symptoms are is unchanged.  Aggravating factors include none.  And alleviating factors include none.    She does not have other concerns.          
daily, Disp: 90 tablet, Rfl: 4    Past Surgical History:   Procedure Laterality Date    WISDOM TOOTH EXTRACTION  2011       OB History   No obstetric history on file.       Family History   Problem Relation Age of Onset    Depression Mother     Anxiety Disorder Mother     Elevated Lipids Mother     Other Mother         Gestational Diabetes    Herniated Disc Father     Osteoarthritis Father     Alcohol Abuse Father     Attention Deficit Disorder Sister     Depression Sister        Social History     Socioeconomic History    Marital status: Single     Spouse name: None    Number of children: None    Years of education: None    Highest education level: None   Tobacco Use    Smoking status: Never    Smokeless tobacco: Never   Vaping Use    Vaping status: Never Used   Substance and Sexual Activity    Alcohol use: Never    Drug use: Never    Sexual activity: Never     Birth control/protection: Pill, Abstinence     Social Determinants of Health     Financial Resource Strain: Low Risk  (6/7/2023)    Overall Financial Resource Strain (CARDIA)     Difficulty of Paying Living Expenses: Not hard at all   Transportation Needs: Unknown (6/7/2023)    PRAPARE - Transportation     Lack of Transportation (Non-Medical): No   Physical Activity: Inactive (6/6/2023)    Exercise Vital Sign     Days of Exercise per Week: 0 days     Minutes of Exercise per Session: 0 min   Intimate Partner Violence: Not At Risk (6/6/2023)    Humiliation, Afraid, Rape, and Kick questionnaire     Fear of Current or Ex-Partner: No     Emotionally Abused: No     Physically Abused: No     Sexually Abused: No   Housing Stability: Unknown (6/7/2023)    Housing Stability Vital Sign     Unstable Housing in the Last Year: No       Allergies   Allergen Reactions    Latex Rash     (Reported by outside source)    Oxycodone      Dizziness  (Reported by outside source)         Patient Active Problem List   Diagnosis    Fibroid       Review of Systems: History obtained

## 2024-08-13 DIAGNOSIS — N93.9 ABNORMAL UTERINE BLEEDING: ICD-10-CM

## 2024-08-13 DIAGNOSIS — R10.2 PELVIC PAIN: Primary | ICD-10-CM

## 2024-08-14 ENCOUNTER — OFFICE VISIT (OUTPATIENT)
Age: 29
End: 2024-08-14
Payer: COMMERCIAL

## 2024-08-14 ENCOUNTER — HOSPITAL ENCOUNTER (OUTPATIENT)
Facility: HOSPITAL | Age: 29
Discharge: HOME | End: 2024-08-17
Payer: COMMERCIAL

## 2024-08-14 VITALS
BODY MASS INDEX: 27.39 KG/M2 | HEART RATE: 90 BPM | SYSTOLIC BLOOD PRESSURE: 122 MMHG | WEIGHT: 164.6 LBS | DIASTOLIC BLOOD PRESSURE: 81 MMHG | TEMPERATURE: 98.3 F | RESPIRATION RATE: 16 BRPM | OXYGEN SATURATION: 98 %

## 2024-08-14 DIAGNOSIS — G89.29 CHRONIC BILATERAL THORACIC BACK PAIN: ICD-10-CM

## 2024-08-14 DIAGNOSIS — M19.90 INFLAMMATORY ARTHRITIS: ICD-10-CM

## 2024-08-14 DIAGNOSIS — M54.6 CHRONIC BILATERAL THORACIC BACK PAIN: ICD-10-CM

## 2024-08-14 DIAGNOSIS — G89.29 CHRONIC BILATERAL LOW BACK PAIN WITHOUT SCIATICA: ICD-10-CM

## 2024-08-14 DIAGNOSIS — Z51.81 MEDICATION MONITORING ENCOUNTER: ICD-10-CM

## 2024-08-14 DIAGNOSIS — M54.50 CHRONIC BILATERAL LOW BACK PAIN WITHOUT SCIATICA: ICD-10-CM

## 2024-08-14 DIAGNOSIS — R76.8 POSITIVE ANA (ANTINUCLEAR ANTIBODY): Primary | ICD-10-CM

## 2024-08-14 PROCEDURE — 72040 X-RAY EXAM NECK SPINE 2-3 VW: CPT

## 2024-08-14 PROCEDURE — 99214 OFFICE O/P EST MOD 30 MIN: CPT | Performed by: NURSE PRACTITIONER

## 2024-08-14 PROCEDURE — 72072 X-RAY EXAM THORAC SPINE 3VWS: CPT

## 2024-08-14 RX ORDER — HYDROXYCHLOROQUINE SULFATE 200 MG/1
200 TABLET, FILM COATED ORAL DAILY
COMMUNITY

## 2024-08-14 RX ORDER — MELOXICAM 7.5 MG/1
7.5 TABLET ORAL DAILY
Qty: 90 TABLET | Refills: 0 | Status: SHIPPED | OUTPATIENT
Start: 2024-08-14

## 2024-08-14 ASSESSMENT — ENCOUNTER SYMPTOMS
DIARRHEA: 0
TROUBLE SWALLOWING: 0
BACK PAIN: 1
NAUSEA: 1
COUGH: 0
BLOOD IN STOOL: 0
SORE THROAT: 0
SHORTNESS OF BREATH: 0
EYE REDNESS: 0
CONSTIPATION: 1
EYE PAIN: 0
ABDOMINAL PAIN: 1
VOMITING: 0

## 2024-08-14 ASSESSMENT — PATIENT HEALTH QUESTIONNAIRE - PHQ9
2. FEELING DOWN, DEPRESSED OR HOPELESS: SEVERAL DAYS
SUM OF ALL RESPONSES TO PHQ QUESTIONS 1-9: 1
SUM OF ALL RESPONSES TO PHQ9 QUESTIONS 1 & 2: 1
1. LITTLE INTEREST OR PLEASURE IN DOING THINGS: NOT AT ALL
SUM OF ALL RESPONSES TO PHQ QUESTIONS 1-9: 1

## 2024-08-14 NOTE — PROGRESS NOTES
Nilesh Rodriguez (:  1995) is a 28 y.o. adult    2024 HERO 1:320 Speckled, 1:640 Nuclear dot, SSA <0.2, SSB <0.2, RNP 0.8, Hough <0.2, Scl 70 <20, CCP 5, RF <14, HLA B27 negative    Subjective   Patient is a 28 year old female here for a follow up visit for a positive HERO. We reviewed her lab and MRI results. She was unable to tolerate Plaquenil because she reports she had eye pain, but she saw her ophthalmologist yesterday who diagnosed her with dry eye. She is willing to restart Plaquenil. She reports mid and low back and foot pain, but she's not sure if the foot pain is related to her surgery. She reports bilateral 4th toe swelling. She has morning stiffness that lasts 5-10 minutes. She denies infections or antibiotics since her last visit.     Review of Systems   Constitutional:  Negative for chills and fever.        Reports she feels feverish, but she doesn't have a temperature.    HENT:  Negative for mouth sores, sore throat and trouble swallowing.         Denies nasals sores  Denies dry mouth   Eyes:  Negative for pain and redness.        She reports dry eyes   Respiratory:  Negative for cough and shortness of breath.    Cardiovascular:  Negative for chest pain.   Gastrointestinal:  Positive for abdominal pain (GERD), constipation and nausea. Negative for blood in stool, diarrhea and vomiting.        Followed by GI   Genitourinary:  Negative for dysuria and hematuria.   Musculoskeletal:  Positive for arthralgias, back pain and joint swelling.   Skin:  Negative for rash.     Current Outpatient Medications   Medication Sig Dispense Refill    famotidine (PEPCID) 40 MG tablet       norethindrone (MICRONOR) 0.35 MG tablet Take 1 tablet by mouth daily 90 tablet 4     No current facility-administered medications for this visit.     Allergies   Allergen Reactions    Latex Rash     (Reported by outside source)    Oxycodone      Dizziness  (Reported by outside source)       Past Medical History:

## 2024-08-14 NOTE — PROGRESS NOTES
Chief Complaint   Patient presents with    Joint Pain     1. Have you been to the ER, urgent care clinic since your last visit?  Hospitalized since your last visit?No    2. Have you seen or consulted any other health care providers outside of the [facility] since your last visit?  Include any pap smears or colon screening.  yes

## 2024-08-15 ENCOUNTER — CLINICAL DOCUMENTATION (OUTPATIENT)
Age: 29
End: 2024-08-15

## 2024-08-15 NOTE — PROGRESS NOTES
Radiology dept called to ask about MRI measurements and location.  Left message requesting report update.    Mira Coppola MD

## 2024-08-22 ENCOUNTER — OFFICE VISIT (OUTPATIENT)
Age: 29
End: 2024-08-22
Payer: COMMERCIAL

## 2024-08-22 VITALS
WEIGHT: 170 LBS | HEART RATE: 88 BPM | SYSTOLIC BLOOD PRESSURE: 126 MMHG | BODY MASS INDEX: 28.29 KG/M2 | DIASTOLIC BLOOD PRESSURE: 85 MMHG

## 2024-08-22 DIAGNOSIS — D21.9 FIBROID: Primary | ICD-10-CM

## 2024-08-22 DIAGNOSIS — N93.9 ABNORMAL UTERINE BLEEDING: ICD-10-CM

## 2024-08-22 DIAGNOSIS — R93.5 ABNORMAL MRI, PELVIS: ICD-10-CM

## 2024-08-22 DIAGNOSIS — R10.2 PELVIC PAIN: ICD-10-CM

## 2024-08-22 PROCEDURE — 99213 OFFICE O/P EST LOW 20 MIN: CPT | Performed by: OBSTETRICS & GYNECOLOGY

## 2024-08-22 RX ORDER — PANTOPRAZOLE SODIUM 40 MG/1
40 TABLET, DELAYED RELEASE ORAL DAILY
COMMUNITY
Start: 2024-08-15

## 2024-08-22 RX ORDER — MELOXICAM 7.5 MG/1
7.5 TABLET ORAL DAILY
COMMUNITY

## 2024-08-22 NOTE — PROGRESS NOTES
[facility]  [email or web address]  [email or web address]  [phone]    Mira Coppola MD, FACOG      OB/GYN Problem visit    HPI  Nilesh Rodriguez is a No obstetric history on file., 28 y.o. adult who presents for a problem visit.   Chief Complaint   Patient presents with    Ultrasound    Follow-up     AUB & Cramping       Spotting has improved on POP.  Pain improved.      Per Rooming Note:  Nilesh Rodriguez is a 28 y.o. adult presents for a problem visit.     Patient's last menstrual period was 08/01/2024 (approximate).  Birth Control: oral progesterone-only contraceptive and abstinence.     Last or next WWE is: 4/22/24     The patient is reporting having: follow up with ultrasound from 08/12/2204 visit. Ultrasound to better characterize myomas and endometrium. Pt has been on Micronor x years, had first breakthrough bleeding in July. Reports painful cramping before cycle starts. During cycle, cramping is minimal. Reports cramping is mostly in the low middle. Has not had any AUB since July.      This is not a new problem.  She has experienced this problem before.     She reports the symptoms are is unchanged.  Aggravating factors include none.  And alleviating factors include none.     She does not have other concerns.     TRANSABDOMINAL ULTRASOUND PERFORMED UTERUS IS ANTEVERTED, NORMAL IN SIZE AND HETEROGENEOUS IN ECHOGENICITY. TWO FIBROIDS ARE SEEN AND MEASURED. FIBROID 1, POSTERIOR, PEDUNCULATED FIBROID 2, ANTERIOR, PEDUNCULATED. ENDOMETRIUM MEASURES 4-5MM IN THICKNESS. NO EVIDENCE OF MASSES OR ABNORMALITIES ARE SEEN. RIGHT OVARY APPEARS WITHIN NORMAL LIMITS. LEFT OVARY APPEARS WITHIN NORMAL LIMITS. NO FREE FLUID SEEN IN THE CDS.     Sexual history and Contraception:  Social History     Substance and Sexual Activity   Sexual Activity Never    Birth control/protection: Pill, Abstinence       Past Medical History:   Diagnosis Date

## 2024-08-22 NOTE — PROGRESS NOTES
Rooming note, gyn problem visit:    Chief Complaint   Patient presents with    Ultrasound    Follow-up     AUB & Cramping     Nilesh Rodrgiuez is a 28 y.o. adult presents for a problem visit.    Patient's last menstrual period was 08/01/2024 (approximate).  Birth Control: oral progesterone-only contraceptive and abstinence.    Last or next WWE is: 4/22/24    The patient is reporting having: follow up with ultrasound from 08/12/2204 visit. Ultrasound to better characterize myomas and endometrium. Pt has been on Micronor x years, had first breakthrough bleeding in July. Reports painful cramping before cycle starts. During cycle, cramping is minimal. Reports cramping is mostly in the low middle. Has not had any AUB since July.     This is not a new problem.  She has experienced this problem before.    She reports the symptoms are is unchanged.  Aggravating factors include none.  And alleviating factors include none.    She does not have other concerns.    TRANSABDOMINAL ULTRASOUND PERFORMED UTERUS IS ANTEVERTED, NORMAL IN SIZE AND HETEROGENEOUS IN ECHOGENICITY. TWO FIBROIDS ARE SEEN AND MEASURED. FIBROID 1, POSTERIOR, PEDUNCULATED FIBROID 2, ANTERIOR, PEDUNCULATED. ENDOMETRIUM MEASURES 4-5MM IN THICKNESS. NO EVIDENCE OF MASSES OR ABNORMALITIES ARE SEEN. RIGHT OVARY APPEARS WITHIN NORMAL LIMITS. LEFT OVARY APPEARS WITHIN NORMAL LIMITS. NO FREE FLUID SEEN IN THE CDS.

## 2024-10-02 ENCOUNTER — PATIENT MESSAGE (OUTPATIENT)
Age: 29
End: 2024-10-02

## 2024-10-02 DIAGNOSIS — M19.90 INFLAMMATORY ARTHRITIS: ICD-10-CM

## 2024-10-02 RX ORDER — HYDROXYCHLOROQUINE SULFATE 200 MG/1
200 TABLET, FILM COATED ORAL DAILY
Qty: 90 TABLET | Refills: 1 | Status: SHIPPED | OUTPATIENT
Start: 2024-10-02 | End: 2025-03-02

## 2024-10-10 NOTE — PATIENT INSTRUCTIONS
Thank you for visiting [facility]!      For future medication refills, we require updated lab results and an upcoming appointment to be in our system to refill prescriptions.  Without these two things, your refill will be DENIED.  If you miss your upcoming appointment, your refill will also be DENIED.      We appreciate your understanding of this critical clinical aspect of our practice. -Dr. Bal & Alysha, NP

## 2024-10-14 ENCOUNTER — OFFICE VISIT (OUTPATIENT)
Age: 29
End: 2024-10-14
Payer: COMMERCIAL

## 2024-10-14 VITALS
BODY MASS INDEX: 28.09 KG/M2 | HEART RATE: 84 BPM | WEIGHT: 168.8 LBS | DIASTOLIC BLOOD PRESSURE: 86 MMHG | SYSTOLIC BLOOD PRESSURE: 121 MMHG | TEMPERATURE: 98.3 F | RESPIRATION RATE: 16 BRPM | OXYGEN SATURATION: 98 %

## 2024-10-14 DIAGNOSIS — G89.29 CHRONIC BILATERAL LOW BACK PAIN WITHOUT SCIATICA: ICD-10-CM

## 2024-10-14 DIAGNOSIS — R76.8 POSITIVE ANA (ANTINUCLEAR ANTIBODY): Primary | ICD-10-CM

## 2024-10-14 DIAGNOSIS — Z51.81 MEDICATION MONITORING ENCOUNTER: ICD-10-CM

## 2024-10-14 DIAGNOSIS — M54.50 CHRONIC BILATERAL LOW BACK PAIN WITHOUT SCIATICA: ICD-10-CM

## 2024-10-14 PROCEDURE — 99214 OFFICE O/P EST MOD 30 MIN: CPT | Performed by: NURSE PRACTITIONER

## 2024-10-14 RX ORDER — CYCLOBENZAPRINE HCL 5 MG
TABLET ORAL
Qty: 60 TABLET | Refills: 0 | Status: SHIPPED | OUTPATIENT
Start: 2024-10-14

## 2024-10-14 RX ORDER — HYDROXYCHLOROQUINE SULFATE 200 MG/1
200 TABLET, FILM COATED ORAL DAILY
Qty: 90 TABLET | Refills: 1 | Status: SHIPPED | OUTPATIENT
Start: 2024-10-14 | End: 2025-04-12

## 2024-10-14 RX ORDER — OMEPRAZOLE 10 MG/1
10 CAPSULE, DELAYED RELEASE ORAL DAILY
COMMUNITY

## 2024-10-14 RX ORDER — PSYLLIUM HUSK 0.4 G
CAPSULE ORAL
COMMUNITY

## 2024-10-14 ASSESSMENT — PATIENT HEALTH QUESTIONNAIRE - PHQ9
1. LITTLE INTEREST OR PLEASURE IN DOING THINGS: NOT AT ALL
SUM OF ALL RESPONSES TO PHQ QUESTIONS 1-9: 0
2. FEELING DOWN, DEPRESSED OR HOPELESS: NOT AT ALL
SUM OF ALL RESPONSES TO PHQ QUESTIONS 1-9: 0
SUM OF ALL RESPONSES TO PHQ9 QUESTIONS 1 & 2: 0

## 2024-10-14 ASSESSMENT — ENCOUNTER SYMPTOMS
TROUBLE SWALLOWING: 0
ABDOMINAL PAIN: 1
SORE THROAT: 0
EYE PAIN: 0
EYE REDNESS: 0
NAUSEA: 1
CONSTIPATION: 1
DIARRHEA: 0
BLOOD IN STOOL: 0
VOMITING: 0
SHORTNESS OF BREATH: 0
COUGH: 0

## 2024-10-14 NOTE — PROGRESS NOTES
moist.   Eyes:      Pupils: Pupils are equal, round, and reactive to light.   Cardiovascular:      Rate and Rhythm: Normal rate and regular rhythm.   Pulmonary:      Effort: Pulmonary effort is normal.      Breath sounds: Normal breath sounds.   Abdominal:      Palpations: Abdomen is soft.      Tenderness: There is no abdominal tenderness.   Musculoskeletal:         General: No swelling, tenderness or deformity. Normal range of motion.      Cervical back: Normal range of motion.      Comments: No synovitis noted on exam     Lymphadenopathy:      Cervical: No cervical adenopathy.   Skin:     General: Skin is warm.      Findings: No rash.   Neurological:      Mental Status: Roque is oriented to person, place, and time.      Motor: No weakness.   Psychiatric:         Mood and Affect: Mood normal.         Behavior: Behavior normal.            Assessment & Plan     Nilesh \"Roque\" was seen today for joint pain.    Diagnoses and all orders for this visit:    Positive HERO (antinuclear antibody)  - No evidence of an active CTD or inflammatory arthritis on exam, she is willing to continue trial of Plaquenil  - Continue Plaquenil 200mg daily  - RTC in 4 months  -     hydroxychloroquine (PLAQUENIL) 200 MG tablet; Take 1 tablet by mouth daily  -     Sedimentation Rate  -     C-Reactive Protein    2. Medication monitoring encounter  -     Comprehensive Metabolic Panel  -     CBC with Auto Differential    3. Chronic bilateral low back pain without sciatica  - Start Flexeril 5mg 1-2 QHS prn until she can get in with PCP  -     cyclobenzaprine (FLEXERIL) 5 MG tablet; Take 1-2 tablets QHS prn back pain        An electronic signature was used to authenticate this note.    --NISREEN Correia - LEELA

## 2024-10-15 LAB
ALBUMIN SERPL-MCNC: 4.1 G/DL (ref 4–5)
ALP SERPL-CCNC: 48 IU/L (ref 44–121)
ALT SERPL-CCNC: 13 IU/L (ref 0–32)
AST SERPL-CCNC: 19 IU/L (ref 0–40)
BASOPHILS # BLD AUTO: 0.1 X10E3/UL (ref 0–0.2)
BASOPHILS NFR BLD AUTO: 1 %
BILIRUB SERPL-MCNC: 0.3 MG/DL (ref 0–1.2)
BUN SERPL-MCNC: 8 MG/DL (ref 6–20)
BUN/CREAT SERPL: 11 (ref 9–23)
CALCIUM SERPL-MCNC: 9.3 MG/DL (ref 8.7–10.2)
CHLORIDE SERPL-SCNC: 104 MMOL/L (ref 96–106)
CO2 SERPL-SCNC: 22 MMOL/L (ref 20–29)
CREAT SERPL-MCNC: 0.76 MG/DL (ref 0.57–1)
CRP SERPL-MCNC: <1 MG/L (ref 0–10)
EGFRCR SERPLBLD CKD-EPI 2021: 108 ML/MIN/1.73
EOSINOPHIL # BLD AUTO: 0.5 X10E3/UL (ref 0–0.4)
EOSINOPHIL NFR BLD AUTO: 6 %
ERYTHROCYTE [DISTWIDTH] IN BLOOD BY AUTOMATED COUNT: 12.4 % (ref 11.7–15.4)
ERYTHROCYTE [SEDIMENTATION RATE] IN BLOOD BY WESTERGREN METHOD: 2 MM/HR (ref 0–32)
GLOBULIN SER CALC-MCNC: 2.4 G/DL (ref 1.5–4.5)
GLUCOSE SERPL-MCNC: 88 MG/DL (ref 70–99)
HCT VFR BLD AUTO: 46.6 % (ref 34–46.6)
HGB BLD-MCNC: 15 G/DL (ref 11.1–15.9)
IMM GRANULOCYTES # BLD AUTO: 0 X10E3/UL (ref 0–0.1)
IMM GRANULOCYTES NFR BLD AUTO: 0 %
LYMPHOCYTES # BLD AUTO: 2.2 X10E3/UL (ref 0.7–3.1)
LYMPHOCYTES NFR BLD AUTO: 28 %
MCH RBC QN AUTO: 28.1 PG (ref 26.6–33)
MCHC RBC AUTO-ENTMCNC: 32.2 G/DL (ref 31.5–35.7)
MCV RBC AUTO: 87 FL (ref 79–97)
MONOCYTES # BLD AUTO: 0.5 X10E3/UL (ref 0.1–0.9)
MONOCYTES NFR BLD AUTO: 7 %
NEUTROPHILS # BLD AUTO: 4.8 X10E3/UL (ref 1.4–7)
NEUTROPHILS NFR BLD AUTO: 58 %
PLATELET # BLD AUTO: 241 X10E3/UL (ref 150–450)
POTASSIUM SERPL-SCNC: 4.1 MMOL/L (ref 3.5–5.2)
PROT SERPL-MCNC: 6.5 G/DL (ref 6–8.5)
RBC # BLD AUTO: 5.34 X10E6/UL (ref 3.77–5.28)
SODIUM SERPL-SCNC: 142 MMOL/L (ref 134–144)
WBC # BLD AUTO: 8.1 X10E3/UL (ref 3.4–10.8)

## 2024-11-08 ENCOUNTER — OFFICE VISIT (OUTPATIENT)
Age: 29
End: 2024-11-08
Payer: COMMERCIAL

## 2024-11-08 VITALS
SYSTOLIC BLOOD PRESSURE: 110 MMHG | DIASTOLIC BLOOD PRESSURE: 80 MMHG | HEART RATE: 84 BPM | WEIGHT: 168 LBS | OXYGEN SATURATION: 99 % | BODY MASS INDEX: 27.99 KG/M2 | HEIGHT: 65 IN

## 2024-11-08 DIAGNOSIS — M35.9 CONNECTIVE TISSUE DISORDER (HCC): ICD-10-CM

## 2024-11-08 DIAGNOSIS — Z87.19 HISTORY OF GASTROESOPHAGEAL REFLUX (GERD): ICD-10-CM

## 2024-11-08 DIAGNOSIS — M79.676 PAIN OF FOOT AND TOES: ICD-10-CM

## 2024-11-08 DIAGNOSIS — M79.673 PAIN OF FOOT AND TOES: ICD-10-CM

## 2024-11-08 DIAGNOSIS — R07.9 CHEST PAIN, UNSPECIFIED TYPE: Primary | ICD-10-CM

## 2024-11-08 PROCEDURE — 99204 OFFICE O/P NEW MOD 45 MIN: CPT | Performed by: INTERNAL MEDICINE

## 2024-11-08 PROCEDURE — 93000 ELECTROCARDIOGRAM COMPLETE: CPT | Performed by: INTERNAL MEDICINE

## 2024-11-08 NOTE — PROGRESS NOTES
Justin Jimenez MD., FAC    [address],[facility],[place] [address]    Office [phone],Fax [phone]           Nilesh Rodriguez is a 29 y.o. adult referred for evaluation of chest pain. Consult requested by Donna Long APRN - NP    CC - as documented in EMR    Dear Donna Cruz, NISREEN Manley NP    I had the pleasure of seeing Ms..Jordan Rodriguez in the office today.      Assessment:     Chest Pain  Pain/bluish discoloration toes.  History of costochondritis/history of GERD-on PPI  Connective Tissue Disorder - on Plaquenil      Plan:      Stress test  Echocardiogram  REUBEN  Follow-up after cardiac workup/6 months with the NP/earlier as needed    Patient understands the plan. All questions were answered to the patient's satisfaction.    I appreciate the opportunity to be involved in  [facility]. See note below for details. Please do not hesitate to contact us   with questions or concerns.    Justin Jimenez MD      Cardiac Testing/ Procedures:       A.Cardiac Cath/PCI:    B.ECHO/ALFONSO:    C.StressNuclear/Stress ECHO/Stress test:    D.Vascular:    E. EP:    F. Miscellaneous:    History:     Nilesh Rodriguez is a 29 y.o. adult who is referred for evaluation of chest tightness.  History of intermittent chest tightness sometimes radiating to the right upper extremity.  Can occur with rest or with exertion.  Has been diagnosed with costochondritis/GERD previously.  However she is on Prilosec and still continues to have the pain.  Also complains of bluish discolorations of the toes especially the right toes.  On Plaquenil for connective tissue disorder but exact diagnosis has not been made.  No prior history of arrhythmia.  No history of dyspnea/dizziness/syncope.      Past Medical/Surgical and Family/Social History:     Past Medical History:   Diagnosis Date    Anxiety     Borderline personality disorder (HCC) 04/2019    Carpal tunnel syndrome 2023    not sure first

## 2024-11-08 NOTE — PROGRESS NOTES
Chief Complaint   Patient presents with    New Patient    Chest Pain     FAM Hx CARDIAC ISSUES     Vitals:    11/08/24 1045   BP: 110/80   Site: Left Upper Arm   Position: Sitting   Cuff Size: Medium Adult   Pulse: 84   SpO2: 99%   Weight: 76.2 kg (168 lb)   Height: 1.651 m (5' 5\")      /80 (Site: Left Upper Arm, Position: Sitting, Cuff Size: Medium Adult)   Pulse 84   Ht 1.651 m (5' 5\")   Wt 76.2 kg (168 lb)   SpO2 99%   BMI 27.96 kg/m²

## 2024-12-13 SDOH — HEALTH STABILITY: PHYSICAL HEALTH: ON AVERAGE, HOW MANY MINUTES DO YOU ENGAGE IN EXERCISE AT THIS LEVEL?: 0 MIN

## 2024-12-13 SDOH — HEALTH STABILITY: PHYSICAL HEALTH: ON AVERAGE, HOW MANY DAYS PER WEEK DO YOU ENGAGE IN MODERATE TO STRENUOUS EXERCISE (LIKE A BRISK WALK)?: 0 DAYS

## 2024-12-16 ENCOUNTER — OFFICE VISIT (OUTPATIENT)
Dept: PRIMARY CARE CLINIC | Facility: CLINIC | Age: 29
End: 2024-12-16
Payer: COMMERCIAL

## 2024-12-16 VITALS
TEMPERATURE: 97.1 F | OXYGEN SATURATION: 96 % | HEART RATE: 90 BPM | DIASTOLIC BLOOD PRESSURE: 80 MMHG | SYSTOLIC BLOOD PRESSURE: 121 MMHG | RESPIRATION RATE: 16 BRPM | HEIGHT: 65 IN | BODY MASS INDEX: 27.82 KG/M2 | WEIGHT: 167 LBS

## 2024-12-16 DIAGNOSIS — G89.4 CHRONIC PAIN SYNDROME: Primary | ICD-10-CM

## 2024-12-16 DIAGNOSIS — R76.8 POSITIVE ANA (ANTINUCLEAR ANTIBODY): ICD-10-CM

## 2024-12-16 PROCEDURE — 99213 OFFICE O/P EST LOW 20 MIN: CPT | Performed by: FAMILY MEDICINE

## 2024-12-16 SDOH — ECONOMIC STABILITY: FOOD INSECURITY: WITHIN THE PAST 12 MONTHS, THE FOOD YOU BOUGHT JUST DIDN'T LAST AND YOU DIDN'T HAVE MONEY TO GET MORE.: NEVER TRUE

## 2024-12-16 SDOH — ECONOMIC STABILITY: FOOD INSECURITY: WITHIN THE PAST 12 MONTHS, YOU WORRIED THAT YOUR FOOD WOULD RUN OUT BEFORE YOU GOT MONEY TO BUY MORE.: NEVER TRUE

## 2024-12-16 SDOH — ECONOMIC STABILITY: INCOME INSECURITY: HOW HARD IS IT FOR YOU TO PAY FOR THE VERY BASICS LIKE FOOD, HOUSING, MEDICAL CARE, AND HEATING?: NOT HARD AT ALL

## 2024-12-16 ASSESSMENT — PATIENT HEALTH QUESTIONNAIRE - PHQ9
SUM OF ALL RESPONSES TO PHQ QUESTIONS 1-9: 0
SUM OF ALL RESPONSES TO PHQ QUESTIONS 1-9: 0
SUM OF ALL RESPONSES TO PHQ9 QUESTIONS 1 & 2: 0
SUM OF ALL RESPONSES TO PHQ QUESTIONS 1-9: 0
SUM OF ALL RESPONSES TO PHQ QUESTIONS 1-9: 0
1. LITTLE INTEREST OR PLEASURE IN DOING THINGS: NOT AT ALL
2. FEELING DOWN, DEPRESSED OR HOPELESS: NOT AT ALL

## 2024-12-16 NOTE — PROGRESS NOTES
\"Have you been to the ER, urgent care clinic since your last visit?  Hospitalized since your last visit?\"    YES - When: approximately 1 months ago.  Where and Why: Cellulitis in right hand.    “Have you seen or consulted any other health care providers outside our system since your last visit?”    YES - When: approximately 1 months ago.  Where and Why: [facility].           
sounds. No wheezing, rhonchi or rales.   Neurological:      General: No focal deficit present.      Mental Status: Roque is alert.   Psychiatric:         Mood and Affect: Mood normal.         Behavior: Behavior normal.       No results found for this or any previous visit (from the past 12 hour(s)).       Assessment / Plan     Nilesh \"Juliannee\" was seen today for established new doctor.    Diagnoses and all orders for this visit:    Chronic pain syndrome  -     External Referral To Pain Clinic    Positive HERO (antinuclear antibody)       Recommend referral to pain management to discuss other treatment options for chronic pain.     No follow-up provider specified.    I have discussed the diagnosis with the patient and the intended plan as seen in the above orders. The patient has received an after-visit summary and questions were answered concerning future plans.  I have discussed medication side effects and warnings with the patient as well.    I spent a total of 22 minutes in both face to face and in non face to face activities for the visit on the date of this encounter.     Tyrel Gilliland, DO

## 2025-01-26 NOTE — PROGRESS NOTES
Primary Cardiologist:  Dr. Tony Bhagat, APRN - NP       Nilesh Rodriguez is a 29 y.o. adult referred for evaluation of chest pain. Consult requested by Nelia Gilliland DO    CC - as documented in EMR    Dear Nelia Bradley DO    I had the pleasure of seeing Ms..Jordan Rodriguez in the office today.      Assessment:     Atypical Chest Pain  Pain/bluish discoloration toes.  History of costochondritis/history of GERD-on PPI  Connective Tissue Disorder - on Plaquenil, follows with Rheumatology  Raynaud syndrome    Plan:      Ex Stress test 11/19/24 normal  Echocardiogram 12/31/24 EF 60% normal  REUBEN 12/31/24 no evidence of arterial occlusive disease within the bilateral lower extremity.    Follow-up PRN/earlier as needed    Patient understands the plan. All questions were answered to the patient's satisfaction.    I appreciate the opportunity to be involved in  [facility]. See note below for details. Please do not hesitate to contact us   with questions or concerns.    NISREEN Everett - NP      Cardiac Testing/ Procedures:       A.Cardiac Cath/PCI:    B.ECHO/ALFONSO:  12/31/24    ECHO (TTE) COMPLETE (PRN CONTRAST/BUBBLE/STRAIN/3D) 12/31/2024 11:43 PM (Final)    Interpretation Summary    Left Ventricle: Normal left ventricular systolic function. EF by 2D Simpsons Biplane is 60%. Left ventricle size is normal. Normal wall thickness. Normal wall motion. Normal diastolic function.    Aortic Valve: Trileaflet valve.    Signed by: Justin Jimenez MD on 12/31/2024 11:43 PM      C.StressNuclear/Stress ECHO/Stress test:  11/18/24    STRESS TEST ONLY EXERCISE 11/19/2024  2:43 PM (Final)    Interpretation Summary    ECG: Resting ECG demonstrates normal sinus rhythm.    ECG: The stress ECG was negative for ischemia.    Stress Test: A Florencio protocol stress test was performed. Overall, the patient's exercise capacity was average for their age. The patient reached stage 3 of the protocol

## 2025-01-27 ENCOUNTER — OFFICE VISIT (OUTPATIENT)
Age: 30
End: 2025-01-27
Payer: COMMERCIAL

## 2025-01-27 VITALS
SYSTOLIC BLOOD PRESSURE: 106 MMHG | HEIGHT: 65 IN | BODY MASS INDEX: 28.82 KG/M2 | HEART RATE: 89 BPM | WEIGHT: 173 LBS | DIASTOLIC BLOOD PRESSURE: 78 MMHG | OXYGEN SATURATION: 97 % | RESPIRATION RATE: 17 BRPM

## 2025-01-27 DIAGNOSIS — R07.9 CHEST PAIN, UNSPECIFIED TYPE: Primary | ICD-10-CM

## 2025-01-27 DIAGNOSIS — M79.673 PAIN OF FOOT AND TOES: ICD-10-CM

## 2025-01-27 DIAGNOSIS — M35.9 CONNECTIVE TISSUE DISORDER (HCC): ICD-10-CM

## 2025-01-27 DIAGNOSIS — I73.00 RAYNAUD'S DISEASE WITHOUT GANGRENE: ICD-10-CM

## 2025-01-27 DIAGNOSIS — M79.676 PAIN OF FOOT AND TOES: ICD-10-CM

## 2025-01-27 DIAGNOSIS — Z87.19 HISTORY OF GASTROESOPHAGEAL REFLUX (GERD): ICD-10-CM

## 2025-01-27 PROCEDURE — 99214 OFFICE O/P EST MOD 30 MIN: CPT

## 2025-01-27 NOTE — PROGRESS NOTES
had concerns including Chest Pain.    Vitals:    01/27/25 1436   BP: 106/78   Site: Left Upper Arm   Position: Sitting   Pulse: 89   Resp: 17   SpO2: 97%   Weight: 78.5 kg (173 lb)   Height: 1.651 m (5' 5\")        Chest pain No    Refills No        1. Have you been to the ER, urgent care clinic since your last visit? No       Hospitalized since your last visit? No       Where?        Date?

## 2025-01-28 ENCOUNTER — TRANSCRIBE ORDERS (OUTPATIENT)
Facility: HOSPITAL | Age: 30
End: 2025-01-28

## 2025-01-28 ENCOUNTER — HOSPITAL ENCOUNTER (OUTPATIENT)
Facility: HOSPITAL | Age: 30
Discharge: HOME | End: 2025-01-31
Payer: COMMERCIAL

## 2025-01-28 DIAGNOSIS — M54.50 LOW BACK PAIN, UNSPECIFIED BACK PAIN LATERALITY, UNSPECIFIED CHRONICITY, UNSPECIFIED WHETHER SCIATICA PRESENT: Primary | ICD-10-CM

## 2025-01-28 DIAGNOSIS — M54.50 LOW BACK PAIN, UNSPECIFIED BACK PAIN LATERALITY, UNSPECIFIED CHRONICITY, UNSPECIFIED WHETHER SCIATICA PRESENT: ICD-10-CM

## 2025-01-28 PROCEDURE — 72100 X-RAY EXAM L-S SPINE 2/3 VWS: CPT

## 2025-02-04 ENCOUNTER — PATIENT MESSAGE (OUTPATIENT)
Age: 30
End: 2025-02-04

## 2025-02-04 DIAGNOSIS — M19.90 INFLAMMATORY ARTHRITIS: Primary | ICD-10-CM

## 2025-02-04 DIAGNOSIS — R76.8 POSITIVE ANA (ANTINUCLEAR ANTIBODY): Primary | ICD-10-CM

## 2025-02-04 DIAGNOSIS — R76.8 POSITIVE ANA (ANTINUCLEAR ANTIBODY): ICD-10-CM

## 2025-02-06 DIAGNOSIS — M19.90 INFLAMMATORY ARTHRITIS: ICD-10-CM

## 2025-02-06 DIAGNOSIS — R76.8 POSITIVE ANA (ANTINUCLEAR ANTIBODY): ICD-10-CM

## 2025-02-06 LAB
ALBUMIN SERPL-MCNC: 3.9 G/DL (ref 3.5–5)
ALBUMIN/GLOB SERPL: 1.3 (ref 1.1–2.2)
ALP SERPL-CCNC: 49 U/L (ref 45–117)
ALT SERPL-CCNC: 18 U/L (ref 12–78)
ANION GAP SERPL CALC-SCNC: 7 MMOL/L (ref 2–12)
AST SERPL-CCNC: 16 U/L (ref 15–37)
BASOPHILS # BLD: 0.04 K/UL (ref 0–0.1)
BASOPHILS NFR BLD: 0.7 % (ref 0–1)
BILIRUB SERPL-MCNC: 0.6 MG/DL (ref 0.2–1)
BUN SERPL-MCNC: 7 MG/DL (ref 6–20)
BUN/CREAT SERPL: 10 (ref 12–20)
CALCIUM SERPL-MCNC: 9.6 MG/DL (ref 8.5–10.1)
CHLORIDE SERPL-SCNC: 107 MMOL/L (ref 97–108)
CO2 SERPL-SCNC: 26 MMOL/L (ref 21–32)
CREAT SERPL-MCNC: 0.7 MG/DL (ref 0.55–1.02)
CRP SERPL-MCNC: <0.29 MG/DL (ref 0–0.3)
DIFFERENTIAL METHOD BLD: ABNORMAL
EOSINOPHIL # BLD: 0.08 K/UL (ref 0–0.4)
EOSINOPHIL NFR BLD: 1.4 % (ref 0–7)
ERYTHROCYTE [DISTWIDTH] IN BLOOD BY AUTOMATED COUNT: 12.2 % (ref 11.5–14.5)
ERYTHROCYTE [SEDIMENTATION RATE] IN BLOOD: 3 MM/HR (ref 0–20)
GLOBULIN SER CALC-MCNC: 3.1 G/DL (ref 2–4)
GLUCOSE SERPL-MCNC: 92 MG/DL (ref 65–100)
HCT VFR BLD AUTO: 45.7 % (ref 35–47)
HGB BLD-MCNC: 15.2 G/DL (ref 11.5–16)
IMM GRANULOCYTES # BLD AUTO: 0.02 K/UL (ref 0–0.04)
IMM GRANULOCYTES NFR BLD AUTO: 0.4 % (ref 0–0.5)
LYMPHOCYTES # BLD: 1.86 K/UL (ref 0.8–3.5)
LYMPHOCYTES NFR BLD: 33.6 % (ref 12–49)
MCH RBC QN AUTO: 28.7 PG (ref 26–34)
MCHC RBC AUTO-ENTMCNC: 33.3 G/DL (ref 30–36.5)
MCV RBC AUTO: 86.2 FL (ref 80–99)
MONOCYTES # BLD: 0.36 K/UL (ref 0–1)
MONOCYTES NFR BLD: 6.5 % (ref 5–13)
NEUTS SEG # BLD: 3.17 K/UL (ref 1.8–8)
NEUTS SEG NFR BLD: 57.4 % (ref 32–75)
NRBC # BLD: 0 K/UL (ref 0–0.01)
NRBC BLD-RTO: 0 PER 100 WBC
PLATELET # BLD AUTO: 235 K/UL (ref 150–400)
PMV BLD AUTO: 10.9 FL (ref 8.9–12.9)
POTASSIUM SERPL-SCNC: 3.8 MMOL/L (ref 3.5–5.1)
PROT SERPL-MCNC: 7 G/DL (ref 6.4–8.2)
RBC # BLD AUTO: 5.3 M/UL (ref 3.8–5.2)
SODIUM SERPL-SCNC: 140 MMOL/L (ref 136–145)
WBC # BLD AUTO: 5.5 K/UL (ref 3.6–11)

## 2025-02-13 NOTE — PATIENT INSTRUCTIONS
Thank you for visiting [facility]!      For future medication refills, we require updated lab results and an upcoming appointment to be in our system to refill prescriptions.  Without these two things, your refill will be DENIED.  If you miss your upcoming appointment, your refill will also be DENIED.      We appreciate your understanding of this critical clinical aspect of our practice. -LEELA Encarnacion

## 2025-02-14 ENCOUNTER — OFFICE VISIT (OUTPATIENT)
Age: 30
End: 2025-02-14

## 2025-02-14 VITALS
BODY MASS INDEX: 29.32 KG/M2 | OXYGEN SATURATION: 97 % | TEMPERATURE: 98.5 F | DIASTOLIC BLOOD PRESSURE: 72 MMHG | RESPIRATION RATE: 16 BRPM | SYSTOLIC BLOOD PRESSURE: 110 MMHG | WEIGHT: 176.2 LBS | HEART RATE: 97 BPM

## 2025-02-14 DIAGNOSIS — R76.8 POSITIVE ANA (ANTINUCLEAR ANTIBODY): Primary | ICD-10-CM

## 2025-02-14 DIAGNOSIS — M54.50 CHRONIC BILATERAL LOW BACK PAIN WITHOUT SCIATICA: ICD-10-CM

## 2025-02-14 DIAGNOSIS — Z51.81 MEDICATION MONITORING ENCOUNTER: ICD-10-CM

## 2025-02-14 DIAGNOSIS — G89.29 CHRONIC BILATERAL LOW BACK PAIN WITHOUT SCIATICA: ICD-10-CM

## 2025-02-14 RX ORDER — METHOCARBAMOL 750 MG/1
750 TABLET, FILM COATED ORAL EVERY 8 HOURS
COMMUNITY
Start: 2025-02-06

## 2025-02-14 RX ORDER — GABAPENTIN 300 MG/1
300 CAPSULE ORAL 2 TIMES DAILY
COMMUNITY
Start: 2025-02-07

## 2025-02-14 ASSESSMENT — ENCOUNTER SYMPTOMS
BLOOD IN STOOL: 0
DIARRHEA: 0
COUGH: 1
CONSTIPATION: 1
EYE REDNESS: 0
SORE THROAT: 0
NAUSEA: 1
ABDOMINAL PAIN: 1
VOMITING: 0
TROUBLE SWALLOWING: 0
EYE PAIN: 0
SHORTNESS OF BREATH: 1

## 2025-02-14 ASSESSMENT — PATIENT HEALTH QUESTIONNAIRE - PHQ9
SUM OF ALL RESPONSES TO PHQ QUESTIONS 1-9: 0
1. LITTLE INTEREST OR PLEASURE IN DOING THINGS: NOT AT ALL
2. FEELING DOWN, DEPRESSED OR HOPELESS: NOT AT ALL
SUM OF ALL RESPONSES TO PHQ9 QUESTIONS 1 & 2: 0
SUM OF ALL RESPONSES TO PHQ QUESTIONS 1-9: 0

## 2025-02-14 NOTE — PROGRESS NOTES
Nilesh Rodriguez (:  1995) is a 29 y.o. adult    2024 HERO 1:320 Speckled, 1:640 Nuclear dot, SSA <0.2, SSB <0.2, RNP 0.8, Hough <0.2, Scl 70 <20, CCP 5, RF <14, HLA B27 negative    Subjective   Patient is a 28 year old female here for a follow up visit for a positive HERO. We reviewed her 2025 labs. She is taking Plaquenil 200mg daily. She reports pain and swelling in her knuckles. She is seeing pain management for hip and low back pain; she reports she was diagnosed with lumbar DDD. They have started her on Gabapenitin BID and Robaxin prn. She states she thinks her pain has increased since starting Plaquenil and she would like to stop it and see how she responds.  She denies infections or antibiotics since her last visit.     Review of Systems   Constitutional:  Negative for chills and fever.   HENT:  Negative for mouth sores, sore throat and trouble swallowing.         Denies nasals sores  Denies dry mouth   Eyes:  Negative for pain and redness.        She reports some dry eye   Respiratory:  Positive for cough and shortness of breath.         Reports she has seen cardiology   Cardiovascular:  Negative for chest pain.        She is seeing cardiology   Gastrointestinal:  Positive for abdominal pain (GERD), constipation and nausea. Negative for blood in stool, diarrhea and vomiting.        She is trying to find a new GI   Denies dark, tarry stools   Genitourinary:  Negative for dysuria and hematuria.   Musculoskeletal:  Positive for arthralgias and joint swelling.   Skin:  Negative for rash.     Current Outpatient Medications   Medication Sig Dispense Refill    hydroxychloroquine (PLAQUENIL) 200 MG tablet Take 1 tablet by mouth daily 90 tablet 1    norethindrone (MICRONOR) 0.35 MG tablet Take 1 tablet by mouth daily 90 tablet 4     No current facility-administered medications for this visit.     Allergies   Allergen Reactions    Latex Rash     (Reported by outside source)    Dust Mite Extract

## 2025-04-02 NOTE — PROGRESS NOTES
Nilesh Rodriguez is a 29 y.o. adult presents for a problem visit.    Chief Complaint   Patient presents with    Ultrasound Followup     Patient's last menstrual period was 03/23/2025 (approximate).  Birth Control: OCP (estrogen/progesterone).  Last Pap: normal obtained 1/5/23    The patient is reporting having: an US f/u        1. Have you been to the ER, urgent care clinic, or hospitalized since your last visit? No    2. Have you seen or consulted any other health care providers outside of the [facility] since your last visit? No    Examination chaperoned by Sameer Hanson MA.

## 2025-04-12 SDOH — ECONOMIC STABILITY: INCOME INSECURITY: IN THE LAST 12 MONTHS, WAS THERE A TIME WHEN YOU WERE NOT ABLE TO PAY THE MORTGAGE OR RENT ON TIME?: NO

## 2025-04-12 SDOH — ECONOMIC STABILITY: TRANSPORTATION INSECURITY
IN THE PAST 12 MONTHS, HAS LACK OF TRANSPORTATION KEPT YOU FROM MEETINGS, WORK, OR FROM GETTING THINGS NEEDED FOR DAILY LIVING?: NO

## 2025-04-12 SDOH — ECONOMIC STABILITY: FOOD INSECURITY: WITHIN THE PAST 12 MONTHS, THE FOOD YOU BOUGHT JUST DIDN'T LAST AND YOU DIDN'T HAVE MONEY TO GET MORE.: NEVER TRUE

## 2025-04-12 SDOH — ECONOMIC STABILITY: FOOD INSECURITY: WITHIN THE PAST 12 MONTHS, YOU WORRIED THAT YOUR FOOD WOULD RUN OUT BEFORE YOU GOT MONEY TO BUY MORE.: NEVER TRUE

## 2025-04-12 SDOH — ECONOMIC STABILITY: TRANSPORTATION INSECURITY
IN THE PAST 12 MONTHS, HAS THE LACK OF TRANSPORTATION KEPT YOU FROM MEDICAL APPOINTMENTS OR FROM GETTING MEDICATIONS?: NO

## 2025-04-15 ENCOUNTER — RESULTS FOLLOW-UP (OUTPATIENT)
Dept: PRIMARY CARE CLINIC | Facility: CLINIC | Age: 30
End: 2025-04-15

## 2025-04-15 ENCOUNTER — HOSPITAL ENCOUNTER (OUTPATIENT)
Facility: HOSPITAL | Age: 30
Discharge: HOME | End: 2025-04-18
Payer: COMMERCIAL

## 2025-04-15 ENCOUNTER — OFFICE VISIT (OUTPATIENT)
Dept: PRIMARY CARE CLINIC | Facility: CLINIC | Age: 30
End: 2025-04-15
Payer: COMMERCIAL

## 2025-04-15 VITALS
TEMPERATURE: 97.9 F | OXYGEN SATURATION: 99 % | HEART RATE: 77 BPM | RESPIRATION RATE: 20 BRPM | HEIGHT: 65 IN | BODY MASS INDEX: 29.42 KG/M2 | WEIGHT: 176.6 LBS | SYSTOLIC BLOOD PRESSURE: 123 MMHG | DIASTOLIC BLOOD PRESSURE: 82 MMHG

## 2025-04-15 DIAGNOSIS — L29.9 GENERALIZED PRURITUS: ICD-10-CM

## 2025-04-15 DIAGNOSIS — R06.02 SHORTNESS OF BREATH: ICD-10-CM

## 2025-04-15 DIAGNOSIS — R06.02 SHORTNESS OF BREATH: Primary | ICD-10-CM

## 2025-04-15 LAB
HCV AB SER IA-ACNC: 0.04 INDEX
HCV AB SERPL QL IA: NONREACTIVE
HIV 1+2 AB+HIV1 P24 AG SERPL QL IA: NONREACTIVE
HIV 1/2 RESULT COMMENT: NORMAL
TSH SERPL DL<=0.05 MIU/L-ACNC: 1.15 UIU/ML (ref 0.36–3.74)

## 2025-04-15 PROCEDURE — 71046 X-RAY EXAM CHEST 2 VIEWS: CPT

## 2025-04-15 PROCEDURE — 93000 ELECTROCARDIOGRAM COMPLETE: CPT | Performed by: FAMILY MEDICINE

## 2025-04-15 PROCEDURE — 99214 OFFICE O/P EST MOD 30 MIN: CPT | Performed by: FAMILY MEDICINE

## 2025-04-15 ASSESSMENT — PATIENT HEALTH QUESTIONNAIRE - PHQ9
SUM OF ALL RESPONSES TO PHQ QUESTIONS 1-9: 0
4. FEELING TIRED OR HAVING LITTLE ENERGY: NOT AT ALL
10. IF YOU CHECKED OFF ANY PROBLEMS, HOW DIFFICULT HAVE THESE PROBLEMS MADE IT FOR YOU TO DO YOUR WORK, TAKE CARE OF THINGS AT HOME, OR GET ALONG WITH OTHER PEOPLE: NOT DIFFICULT AT ALL
SUM OF ALL RESPONSES TO PHQ QUESTIONS 1-9: 0
SUM OF ALL RESPONSES TO PHQ QUESTIONS 1-9: 0
7. TROUBLE CONCENTRATING ON THINGS, SUCH AS READING THE NEWSPAPER OR WATCHING TELEVISION: NOT AT ALL
5. POOR APPETITE OR OVEREATING: NOT AT ALL
SUM OF ALL RESPONSES TO PHQ QUESTIONS 1-9: 0
6. FEELING BAD ABOUT YOURSELF - OR THAT YOU ARE A FAILURE OR HAVE LET YOURSELF OR YOUR FAMILY DOWN: NOT AT ALL
3. TROUBLE FALLING OR STAYING ASLEEP: NOT AT ALL
1. LITTLE INTEREST OR PLEASURE IN DOING THINGS: NOT AT ALL
9. THOUGHTS THAT YOU WOULD BE BETTER OFF DEAD, OR OF HURTING YOURSELF: NOT AT ALL
8. MOVING OR SPEAKING SO SLOWLY THAT OTHER PEOPLE COULD HAVE NOTICED. OR THE OPPOSITE, BEING SO FIGETY OR RESTLESS THAT YOU HAVE BEEN MOVING AROUND A LOT MORE THAN USUAL: NOT AT ALL
2. FEELING DOWN, DEPRESSED OR HOPELESS: NOT AT ALL

## 2025-04-15 NOTE — PROGRESS NOTES
Health Decision Maker has been checked with the patient      AI form was signed    Chief Complaint   Patient presents with    Shortness of Breath     Patient has no chest pain. She gets very winded when moving around.    Pruritis     All over itching. Has seen Derm but they did not know anything she states.       \"Have you been to the ER, urgent care clinic since your last visit?  Hospitalized since your last visit?\"    NO    “Have you seen or consulted any other health care providers outside of [facility] since your last visit?”    NO      Vitals:    04/15/25 1126   Temp: 97.9 °F (36.6 °C)   TempSrc: Oral   Weight: 80.1 kg (176 lb 9.6 oz)   Height: 1.651 m (5' 5\")      Depression: Not at risk (2/14/2025)    PHQ-2     PHQ-2 Score: 0              Click Here for Release of Records Request    Chart reviewed: immunizations are documented.   Immunization History   Administered Date(s) Administered    COVID-19, PFIZER PURPLE top, DILUTE for use, (age 12 y+), 30mcg/0.3mL 04/28/2021, 05/26/2021    Influenza Virus Vaccine 10/01/2019

## 2025-04-15 NOTE — PROGRESS NOTES
HPI     Chief Complaint   Patient presents with    Shortness of Breath     Patient has no chest pain. They get very winded when moving around.    Pruritis     All over itching. Has seen Derm but they did not know anything      Roque is a 29 y.o. adult who presents for shortness of breath and itching.     Started having shortness of breath 20 years ago. First time was when they were 9 YO. States they couldn't breathe in the bathroom and states their parents dismissed it. Has been going on and off and mentioned this to a friend who said this was not normal and should see a doctor. States it is easier to breathe if they get cold air in. Not able to figure out when it occurs. Sometimes doing nothing when it happens, sometimes related to exercise, sometimes when about to sleep. Has not woken them up before. States they do have random coughs that are dry. Has not had an inhaler. Has not seen Pulm that they can remember.     States they have itching for 10+ years. Started in 2016. States it is all over and random. On breast, groin, scalp, abdomen, everywhere. Typically does not see a rash. Has changed soaps, shampoo, detergents. Moisturizes and nothing helps. States it is can be minor or more severe. Has woken them up from sleep. Saw Derm who gave them a bunch of products. Did a bx that came back inconclusive and was told it might be neurologic.     Reviewed PmHx, RxHx, FmHx, SocHx, AllgHx and updated and dated in the chart.    Physical Exam:  /82   Pulse 77   Temp 97.9 °F (36.6 °C) (Oral)   Resp 20   Ht 1.651 m (5' 5\")   Wt 80.1 kg (176 lb 9.6 oz)   SpO2 99%   BMI 29.39 kg/m²   Physical Exam  Vitals and nursing note reviewed.   Constitutional:       General: Roque is not in acute distress.     Appearance: Normal appearance. Vikasycyumiko is not ill-appearing.   Cardiovascular:      Rate and Rhythm: Normal rate and regular rhythm.      Heart sounds: No murmur heard.  Pulmonary:      Effort: Pulmonary effort is

## 2025-04-16 ENCOUNTER — RESULTS FOLLOW-UP (OUTPATIENT)
Dept: PRIMARY CARE CLINIC | Facility: CLINIC | Age: 30
End: 2025-04-16

## 2025-04-21 DIAGNOSIS — D25.9 UTERINE LEIOMYOMA, UNSPECIFIED LOCATION: Primary | ICD-10-CM

## 2025-04-22 ENCOUNTER — OFFICE VISIT (OUTPATIENT)
Age: 30
End: 2025-04-22
Payer: COMMERCIAL

## 2025-04-22 VITALS
HEIGHT: 65 IN | OXYGEN SATURATION: 98 % | SYSTOLIC BLOOD PRESSURE: 110 MMHG | BODY MASS INDEX: 29.19 KG/M2 | DIASTOLIC BLOOD PRESSURE: 79 MMHG | WEIGHT: 175.2 LBS | HEART RATE: 71 BPM

## 2025-04-22 DIAGNOSIS — N94.6 DYSMENORRHEA: ICD-10-CM

## 2025-04-22 DIAGNOSIS — D21.9 FIBROID: Primary | ICD-10-CM

## 2025-04-22 PROCEDURE — 99213 OFFICE O/P EST LOW 20 MIN: CPT | Performed by: OBSTETRICS & GYNECOLOGY

## 2025-04-22 RX ORDER — DROSPIRENONE 4 MG/1
1 TABLET, FILM COATED ORAL DAILY
Qty: 84 TABLET | Refills: 0 | Status: SHIPPED | OUTPATIENT
Start: 2025-04-22

## 2025-04-22 NOTE — PROGRESS NOTES
OB/GYN Problem VIsit    HPI  Nilesh Rodriguez is a No obstetric history on file.,  29 y.o. adult who presents for a problem visit.  Patient was seen today for follow-up of uterine fibroids.  Last ultrasound was done 8/24 and this was reviewed today.  She has significant dysmenorrhea.  She takes Micronor.  She has a history of migraine with aura.  She does have some irregular bleeding with the Micronor.  She is not sexually active.  She does not have plans for future childbearing.  The cramping is pretty significant.  She has a history of ulcers and cannot take NSAIDs and she says Tylenol does not work.  She is possibly interested in hysterectomy in the future.        Past Medical History:   Diagnosis Date    Anxiety     Autoimmune disorder 03/29/24    Not formally diagnosed with a specific disorder, just generalized. Waiting for tests and a referral to specialist for formal diagnosis    Borderline personality disorder (HCC) 04/2019    Carpal tunnel syndrome 2023    not sure first day, it happens on and off in my right wrist    Chronic back pain     Concussion 2016    Depression     Eczema     hands    Headache     Migraine     Myoma     MRI: Addendum: A 4.5 cm x 4.3 cm x 3.8 cm posterior body subserosal fibroid noted (9-13, 3-12). The remainder of the uterus is not well evaluated due to saturation band.    Pap smear for cervical cancer screening 01/05/2023    normal     Past Surgical History:   Procedure Laterality Date    WISDOM TOOTH EXTRACTION  2011     Social History     Occupational History    Not on file   Tobacco Use    Smoking status: Never    Smokeless tobacco: Never   Vaping Use    Vaping status: Never Used   Substance and Sexual Activity    Alcohol use: Never    Drug use: Never    Sexual activity: Never     Birth control/protection: Pill, Abstinence     Family History   Problem Relation Age of Onset    Depression Mother     Anxiety Disorder Mother     Elevated Lipids Mother     Other Mother

## 2025-04-22 NOTE — PROGRESS NOTES
ANNUAL EXAM AGES 18-39    History:  Nilesh Rodriguez is a 29 y.o. year old No obstetric history on file. White (non-) adult   Patient's last menstrual period was 03/23/2025 (approximate).    She presents for problem visit  Nurse Notes:  ***  -------------------------------------------------------------------------------------------------------------------    History of Present Illness      Family History   Problem Relation Age of Onset    Depression Mother     Anxiety Disorder Mother     Elevated Lipids Mother     Other Mother         Gestational Diabetes    Herniated Disc Father     Osteoarthritis Father     Alcohol Abuse Father     Attention Deficit Disorder Sister     Depression Sister        Past Medical History:   Diagnosis Date    Anxiety     Autoimmune disorder 03/29/24    Not formally diagnosed with a specific disorder, just generalized. Waiting for tests and a referral to specialist for formal diagnosis    Borderline personality disorder (HCC) 04/2019    Carpal tunnel syndrome 2023    not sure first day, it happens on and off in my right wrist    Chronic back pain     Concussion 2016    Depression     Eczema     hands    Headache     Migraine     Myoma     MRI: Addendum: A 4.5 cm x 4.3 cm x 3.8 cm posterior body subserosal fibroid noted (9-13, 3-12). The remainder of the uterus is not well evaluated due to saturation band.    Pap smear for cervical cancer screening 01/05/2023    normal     Past Surgical History:   Procedure Laterality Date    WISDOM TOOTH EXTRACTION  2011       Current Outpatient Medications   Medication Sig Dispense Refill    gabapentin (NEURONTIN) 300 MG capsule Take 1 capsule by mouth 2 times daily.      methocarbamol (ROBAXIN) 750 MG tablet Take 1 tablet by mouth every 8 (eight) hours      norethindrone (MICRONOR) 0.35 MG tablet Take 1 tablet by mouth daily 90 tablet 4     No current facility-administered medications for this visit.     Allergies: Latex, Dust mite extract,

## 2025-04-22 NOTE — PROGRESS NOTES
Order placed for transabdominal ultrasound from provider , verified by Verbal Order Read Back with provider.

## 2025-06-25 NOTE — PROGRESS NOTES
Nilesh Rodriguez is a 29 y.o. adult presents for a problem visit.    Chief Complaint   Patient presents with    Follow-up     Follow up with SLYND to help with cramping     No LMP recorded.  Birth Control: OCP (estrogen/progesterone).  Last Pap: normal obtained 1/5/2023    The patient presents for f/u for BC medication      1. Have you been to the ER, urgent care clinic, or hospitalized since your last visit? No    2. Have you seen or consulted any other health care providers outside of the [facility] since your last visit? No    Examination chaperoned by Sameer Hanson MA.

## 2025-07-10 ENCOUNTER — TRANSCRIBE ORDERS (OUTPATIENT)
Facility: HOSPITAL | Age: 30
End: 2025-07-10

## 2025-07-10 DIAGNOSIS — G89.11 ACUTE PAIN DUE TO TRAUMA: Primary | ICD-10-CM

## 2025-07-14 RX ORDER — DROSPIRENONE 4 MG/1
1 TABLET, FILM COATED ORAL DAILY
Qty: 84 TABLET | Refills: 0 | OUTPATIENT
Start: 2025-07-14

## 2025-07-23 ENCOUNTER — OFFICE VISIT (OUTPATIENT)
Age: 30
End: 2025-07-23
Payer: COMMERCIAL

## 2025-07-23 ENCOUNTER — HOSPITAL ENCOUNTER (OUTPATIENT)
Facility: HOSPITAL | Age: 30
Discharge: HOME | End: 2025-07-26
Payer: COMMERCIAL

## 2025-07-23 VITALS
BODY MASS INDEX: 27.66 KG/M2 | HEIGHT: 65 IN | WEIGHT: 166 LBS | HEART RATE: 87 BPM | SYSTOLIC BLOOD PRESSURE: 111 MMHG | DIASTOLIC BLOOD PRESSURE: 79 MMHG

## 2025-07-23 DIAGNOSIS — R10.2 PELVIC PAIN: Primary | ICD-10-CM

## 2025-07-23 DIAGNOSIS — G89.11 ACUTE PAIN DUE TO TRAUMA: ICD-10-CM

## 2025-07-23 PROCEDURE — 74177 CT ABD & PELVIS W/CONTRAST: CPT

## 2025-07-23 PROCEDURE — 6360000004 HC RX CONTRAST MEDICATION: Performed by: RADIOLOGY

## 2025-07-23 PROCEDURE — 99212 OFFICE O/P EST SF 10 MIN: CPT | Performed by: OBSTETRICS & GYNECOLOGY

## 2025-07-23 RX ORDER — IOPAMIDOL 755 MG/ML
100 INJECTION, SOLUTION INTRAVASCULAR
Status: COMPLETED | OUTPATIENT
Start: 2025-07-23 | End: 2025-07-23

## 2025-07-23 RX ADMIN — IOPAMIDOL 100 ML: 755 INJECTION, SOLUTION INTRAVENOUS at 19:17

## 2025-07-23 NOTE — PROGRESS NOTES
History:  Nilesh Rodriguez is a 29 y.o. year old No obstetric history on file. White (non-) adult   No LMP recorded.    She presents for problem visit  Nurse Notes:  No LMP recorded.  Birth Control: OCP (estrogen/progesterone).  Last Pap: normal obtained 1/5/2023     The patient presents for f/u for BC medication  -------------------------------------------------------------------------------------------------------------------    History of Present Illness  The patient presents for evaluation of pelvic pain    She reports experiencing severe, stabbing pain in her uterus, which she describes as feeling like being repeatedly stabbed. This pain occurred outside of her menstrual period. She took the Slynd fro 3 months and just stopped and restarted Micronor. She has had two periods within a four-week span, with the second one occurring two weeks after the first. The pain has persisted even when she is not menstruating, but she has not experienced any bleeding since 07/15/2025. She typically experiences cramping a week before her period, but the current pain is more intense and feels like simultaneous stabbing in both ovaries and the uterus. She also reports recurrent nausea and stomach pain. A CT scan is scheduled by PCP. She is uncertain about the cause of her symptoms but is confident that it is not related to her GI sx. She has been experiencing this pain for over 72 hours, and it is distinct from her usual abdominal discomfort.  She underwent a Pap smear in 01/2023 and does not believe she needs another one at this time. She has returned to her previous birth control method due to increased frequency and severity of pain, as well as unpredictable bleeding. She had been using the new method for three months, during which she continued to experience irregular bleeding.    GYNECOLOGICAL HISTORY:  - Menstrual pain: Present    CONTRACEPTION:  Micronor, returned to previous birth control method due to

## 2025-07-23 NOTE — PROGRESS NOTES
Nilesh Rodriguez is a 29 y.o. adult presents for a problem visit.    Chief Complaint   Patient presents with    Follow-up     Follow up with SLYND to help with cramping     No LMP recorded.  Birth Control: OCP (estrogen/progesterone).  Last Pap: normal obtained 1/5/2023    The patient presents for f/u for SLYND to help with cramping        1. Have you been to the ER, urgent care clinic, or hospitalized since your last visit? No    2. Have you seen or consulted any other health care providers outside of the [facility] since your last visit? No    Examination chaperoned by FARRUKH FOREMAN CMA.   Transfer Note

## 2025-08-10 ENCOUNTER — APPOINTMENT (OUTPATIENT)
Facility: HOSPITAL | Age: 30
End: 2025-08-10
Payer: COMMERCIAL

## 2025-08-10 ENCOUNTER — HOSPITAL ENCOUNTER (EMERGENCY)
Facility: HOSPITAL | Age: 30
Discharge: HOME | End: 2025-08-10
Attending: STUDENT IN AN ORGANIZED HEALTH CARE EDUCATION/TRAINING PROGRAM
Payer: COMMERCIAL

## 2025-08-10 VITALS
DIASTOLIC BLOOD PRESSURE: 69 MMHG | BODY MASS INDEX: 27.62 KG/M2 | OXYGEN SATURATION: 92 % | HEART RATE: 82 BPM | TEMPERATURE: 98.4 F | HEIGHT: 65 IN | SYSTOLIC BLOOD PRESSURE: 113 MMHG | RESPIRATION RATE: 18 BRPM

## 2025-08-10 DIAGNOSIS — R06.02 SHORTNESS OF BREATH: Primary | ICD-10-CM

## 2025-08-10 DIAGNOSIS — R53.1 GENERALIZED WEAKNESS: ICD-10-CM

## 2025-08-10 LAB
ALBUMIN SERPL-MCNC: 3.9 G/DL (ref 3.5–5.2)
ALBUMIN/GLOB SERPL: 1.4 (ref 1.1–2.2)
ALP SERPL-CCNC: 52 U/L (ref 35–104)
ALT SERPL-CCNC: 12 U/L (ref 10–35)
ANION GAP SERPL CALC-SCNC: 12 MMOL/L (ref 2–14)
AST SERPL-CCNC: 21 U/L (ref 10–35)
BASOPHILS # BLD: 0.04 K/UL (ref 0–0.1)
BASOPHILS NFR BLD: 0.4 % (ref 0–1)
BILIRUB SERPL-MCNC: 0.4 MG/DL (ref 0–1.2)
BUN SERPL-MCNC: 7 MG/DL (ref 6–20)
BUN/CREAT SERPL: 8 (ref 12–20)
CALCIUM SERPL-MCNC: 9.2 MG/DL (ref 8.6–10)
CHLORIDE SERPL-SCNC: 105 MMOL/L (ref 98–107)
CO2 SERPL-SCNC: 22 MMOL/L (ref 20–29)
CREAT SERPL-MCNC: 0.91 MG/DL (ref 0.6–1)
D DIMER PPP FEU-MCNC: 0.27 MG/L FEU (ref 0–0.65)
DIFFERENTIAL METHOD BLD: ABNORMAL
EOSINOPHIL # BLD: 0.05 K/UL (ref 0–0.4)
EOSINOPHIL NFR BLD: 0.5 % (ref 0–7)
ERYTHROCYTE [DISTWIDTH] IN BLOOD BY AUTOMATED COUNT: 12.5 % (ref 11.5–14.5)
GLOBULIN SER CALC-MCNC: 2.9 G/DL (ref 2–4)
GLUCOSE SERPL-MCNC: 89 MG/DL (ref 65–100)
HCT VFR BLD AUTO: 45.4 % (ref 35–47)
HGB BLD-MCNC: 15.5 G/DL (ref 11.5–16)
IMM GRANULOCYTES # BLD AUTO: 0.05 K/UL (ref 0–0.04)
IMM GRANULOCYTES NFR BLD AUTO: 0.5 % (ref 0–0.5)
LYMPHOCYTES # BLD: 2.83 K/UL (ref 0.8–3.5)
LYMPHOCYTES NFR BLD: 28.5 % (ref 12–49)
MCH RBC QN AUTO: 29 PG (ref 26–34)
MCHC RBC AUTO-ENTMCNC: 34.1 G/DL (ref 30–36.5)
MCV RBC AUTO: 84.9 FL (ref 80–99)
MONOCYTES # BLD: 0.54 K/UL (ref 0–1)
MONOCYTES NFR BLD: 5.4 % (ref 5–13)
NEUTS SEG # BLD: 6.43 K/UL (ref 1.8–8)
NEUTS SEG NFR BLD: 64.7 % (ref 32–75)
NRBC # BLD: 0 K/UL (ref 0–0.01)
NRBC BLD-RTO: 0 PER 100 WBC
PLATELET # BLD AUTO: 204 K/UL (ref 150–400)
PMV BLD AUTO: 10 FL (ref 8.9–12.9)
POTASSIUM SERPL-SCNC: 3.4 MMOL/L (ref 3.5–5.1)
PROT SERPL-MCNC: 6.8 G/DL (ref 6.4–8.3)
RBC # BLD AUTO: 5.35 M/UL (ref 3.8–5.2)
SODIUM SERPL-SCNC: 140 MMOL/L (ref 136–145)
TROPONIN T SERPL HS-MCNC: <6 NG/L (ref 0–14)
WBC # BLD AUTO: 9.9 K/UL (ref 3.6–11)

## 2025-08-10 PROCEDURE — 93005 ELECTROCARDIOGRAM TRACING: CPT | Performed by: STUDENT IN AN ORGANIZED HEALTH CARE EDUCATION/TRAINING PROGRAM

## 2025-08-10 PROCEDURE — 80053 COMPREHEN METABOLIC PANEL: CPT

## 2025-08-10 PROCEDURE — 99285 EMERGENCY DEPT VISIT HI MDM: CPT

## 2025-08-10 PROCEDURE — 85025 COMPLETE CBC W/AUTO DIFF WBC: CPT

## 2025-08-10 PROCEDURE — 85379 FIBRIN DEGRADATION QUANT: CPT

## 2025-08-10 PROCEDURE — 36415 COLL VENOUS BLD VENIPUNCTURE: CPT

## 2025-08-10 PROCEDURE — 84484 ASSAY OF TROPONIN QUANT: CPT

## 2025-08-10 PROCEDURE — 71046 X-RAY EXAM CHEST 2 VIEWS: CPT

## 2025-08-10 ASSESSMENT — PAIN SCALES - GENERAL: PAINLEVEL_OUTOF10: 0

## 2025-08-10 ASSESSMENT — PAIN - FUNCTIONAL ASSESSMENT: PAIN_FUNCTIONAL_ASSESSMENT: 0-10

## 2025-08-12 LAB
EKG ATRIAL RATE: 94 BPM
EKG DIAGNOSIS: NORMAL
EKG P AXIS: 62 DEGREES
EKG P-R INTERVAL: 148 MS
EKG Q-T INTERVAL: 348 MS
EKG QRS DURATION: 68 MS
EKG QTC CALCULATION (BAZETT): 435 MS
EKG R AXIS: 50 DEGREES
EKG T AXIS: 30 DEGREES
EKG VENTRICULAR RATE: 94 BPM

## 2025-08-12 PROCEDURE — 93010 ELECTROCARDIOGRAM REPORT: CPT | Performed by: SPECIALIST
